# Patient Record
Sex: MALE | Race: WHITE | URBAN - METROPOLITAN AREA
[De-identification: names, ages, dates, MRNs, and addresses within clinical notes are randomized per-mention and may not be internally consistent; named-entity substitution may affect disease eponyms.]

---

## 2017-07-03 ENCOUNTER — OFFICE VISIT (OUTPATIENT)
Dept: FAMILY MEDICINE CLINIC | Age: 57
End: 2017-07-03
Payer: MEDICARE

## 2017-07-03 VITALS
HEART RATE: 87 BPM | DIASTOLIC BLOOD PRESSURE: 80 MMHG | TEMPERATURE: 98.2 F | OXYGEN SATURATION: 99 % | SYSTOLIC BLOOD PRESSURE: 132 MMHG | HEIGHT: 72 IN | WEIGHT: 184.97 LBS | BODY MASS INDEX: 25.05 KG/M2 | RESPIRATION RATE: 17 BRPM

## 2017-07-03 DIAGNOSIS — L02.511 ABSCESS OF RIGHT HAND: Primary | ICD-10-CM

## 2017-07-03 PROCEDURE — S0077 INJECTION, CLINDAMYCIN PHOSP: HCPCS | Performed by: FAMILY MEDICINE

## 2017-07-03 PROCEDURE — 99214 OFFICE O/P EST MOD 30 MIN: CPT | Performed by: FAMILY MEDICINE

## 2017-07-03 PROCEDURE — 10060 I&D ABSCESS SIMPLE/SINGLE: CPT | Performed by: FAMILY MEDICINE

## 2017-07-03 RX ORDER — IBUPROFEN 800 MG/1
800 TABLET ORAL EVERY 6 HOURS PRN
Qty: 90 TABLET | Refills: 3 | Status: SHIPPED | OUTPATIENT
Start: 2017-07-03 | End: 2017-08-02

## 2017-07-03 RX ORDER — CLINDAMYCIN HYDROCHLORIDE 150 MG/1
150 CAPSULE ORAL 3 TIMES DAILY
Qty: 21 CAPSULE | Refills: 0 | Status: SHIPPED | OUTPATIENT
Start: 2017-07-03 | End: 2017-07-10

## 2017-07-03 RX ORDER — HYDROCODONE BITARTRATE AND ACETAMINOPHEN 5; 325 MG/1; MG/1
0.5 TABLET ORAL 2 TIMES DAILY
Qty: 10 TABLET | Refills: 0 | Status: SHIPPED | OUTPATIENT
Start: 2017-07-03 | End: 2017-07-13

## 2017-07-03 RX ORDER — LIDOCAINE HYDROCHLORIDE 10 MG/ML
5 INJECTION, SOLUTION INFILTRATION; PERINEURAL ONCE
Status: COMPLETED | OUTPATIENT
Start: 2017-07-03 | End: 2017-07-03

## 2017-07-03 RX ORDER — CLINDAMYCIN PHOSPHATE 150 MG/ML
600 INJECTION, SOLUTION INTRAVENOUS ONCE
Status: COMPLETED | OUTPATIENT
Start: 2017-07-03 | End: 2017-07-03

## 2017-07-03 RX ADMIN — CLINDAMYCIN PHOSPHATE 600 MG: 150 INJECTION, SOLUTION INTRAVENOUS at 19:57

## 2017-07-03 RX ADMIN — LIDOCAINE HYDROCHLORIDE 5 ML: 10 INJECTION, SOLUTION INFILTRATION; PERINEURAL at 19:57

## 2017-07-03 ASSESSMENT — ENCOUNTER SYMPTOMS
GASTROINTESTINAL NEGATIVE: 1
ALLERGIC/IMMUNOLOGIC NEGATIVE: 1
EYES NEGATIVE: 1
RESPIRATORY NEGATIVE: 1

## 2021-01-01 ENCOUNTER — APPOINTMENT (OUTPATIENT)
Dept: ULTRASOUND IMAGING | Age: 61
DRG: 174 | End: 2021-01-01
Payer: MEDICARE

## 2021-01-01 ENCOUNTER — APPOINTMENT (OUTPATIENT)
Dept: GENERAL RADIOLOGY | Age: 61
DRG: 174 | End: 2021-01-01
Payer: MEDICARE

## 2021-01-01 ENCOUNTER — HOSPITAL ENCOUNTER (INPATIENT)
Age: 61
LOS: 4 days | DRG: 174 | End: 2021-08-23
Attending: EMERGENCY MEDICINE | Admitting: INTERNAL MEDICINE
Payer: MEDICARE

## 2021-01-01 ENCOUNTER — APPOINTMENT (OUTPATIENT)
Dept: MRI IMAGING | Age: 61
DRG: 174 | End: 2021-01-01
Payer: MEDICARE

## 2021-01-01 VITALS
BODY MASS INDEX: 21.16 KG/M2 | DIASTOLIC BLOOD PRESSURE: 37 MMHG | OXYGEN SATURATION: 81 % | HEIGHT: 74 IN | RESPIRATION RATE: 32 BRPM | WEIGHT: 164.9 LBS | SYSTOLIC BLOOD PRESSURE: 52 MMHG | TEMPERATURE: 97.9 F

## 2021-01-01 DIAGNOSIS — I21.3 ST ELEVATION MYOCARDIAL INFARCTION (STEMI), UNSPECIFIED ARTERY (HCC): ICD-10-CM

## 2021-01-01 DIAGNOSIS — I46.9 CARDIAC ARREST (HCC): Primary | ICD-10-CM

## 2021-01-01 LAB
-: NORMAL
ABSOLUTE EOS #: 0 K/UL (ref 0–0.4)
ABSOLUTE IMMATURE GRANULOCYTE: 0.43 K/UL (ref 0–0.3)
ABSOLUTE LYMPH #: 7.46 K/UL (ref 1–4.8)
ABSOLUTE MONO #: 1.7 K/UL (ref 0.1–0.8)
ALBUMIN SERPL-MCNC: 3 G/DL (ref 3.5–5.2)
ALBUMIN/GLOBULIN RATIO: 1.4 (ref 1–2.5)
ALLEN TEST: ABNORMAL
ALP BLD-CCNC: 69 U/L (ref 40–129)
ALT SERPL-CCNC: 44 U/L (ref 5–41)
AMORPHOUS: NORMAL
ANION GAP SERPL CALCULATED.3IONS-SCNC: 11 MMOL/L (ref 9–17)
ANION GAP SERPL CALCULATED.3IONS-SCNC: 11 MMOL/L (ref 9–17)
ANION GAP SERPL CALCULATED.3IONS-SCNC: 12 MMOL/L (ref 9–17)
ANION GAP SERPL CALCULATED.3IONS-SCNC: 12 MMOL/L (ref 9–17)
ANION GAP SERPL CALCULATED.3IONS-SCNC: 13 MMOL/L (ref 9–17)
ANION GAP SERPL CALCULATED.3IONS-SCNC: 14 MMOL/L (ref 9–17)
ANION GAP SERPL CALCULATED.3IONS-SCNC: 14 MMOL/L (ref 9–17)
ANION GAP SERPL CALCULATED.3IONS-SCNC: 16 MMOL/L (ref 9–17)
ANION GAP SERPL CALCULATED.3IONS-SCNC: 22 MMOL/L (ref 9–17)
ANION GAP: 12 MMOL/L (ref 7–16)
ANION GAP: 12 MMOL/L (ref 7–16)
AST SERPL-CCNC: 125 U/L
BACTERIA: NORMAL
BASOPHILS # BLD: 0 % (ref 0–2)
BASOPHILS ABSOLUTE: 0 K/UL (ref 0–0.2)
BILIRUB SERPL-MCNC: 0.41 MG/DL (ref 0.3–1.2)
BILIRUBIN URINE: NEGATIVE
BNP INTERPRETATION: ABNORMAL
BUN BLDV-MCNC: 16 MG/DL (ref 6–20)
BUN BLDV-MCNC: 19 MG/DL (ref 6–20)
BUN BLDV-MCNC: 20 MG/DL (ref 6–20)
BUN BLDV-MCNC: 26 MG/DL (ref 6–20)
BUN BLDV-MCNC: 27 MG/DL (ref 6–20)
BUN BLDV-MCNC: 37 MG/DL (ref 6–20)
BUN/CREAT BLD: ABNORMAL (ref 9–20)
CALCIUM IONIZED: 1.02 MMOL/L (ref 1.13–1.33)
CALCIUM IONIZED: 1.13 MMOL/L (ref 1.13–1.33)
CALCIUM IONIZED: 1.15 MMOL/L (ref 1.13–1.33)
CALCIUM IONIZED: 1.15 MMOL/L (ref 1.13–1.33)
CALCIUM IONIZED: 1.16 MMOL/L (ref 1.13–1.33)
CALCIUM IONIZED: 1.16 MMOL/L (ref 1.13–1.33)
CALCIUM IONIZED: 1.23 MMOL/L (ref 1.13–1.33)
CALCIUM SERPL-MCNC: 7.1 MG/DL (ref 8.6–10.4)
CALCIUM SERPL-MCNC: 7.4 MG/DL (ref 8.6–10.4)
CALCIUM SERPL-MCNC: 7.8 MG/DL (ref 8.6–10.4)
CALCIUM SERPL-MCNC: 7.9 MG/DL (ref 8.6–10.4)
CALCIUM SERPL-MCNC: 8 MG/DL (ref 8.6–10.4)
CALCIUM SERPL-MCNC: 8.1 MG/DL (ref 8.6–10.4)
CALCIUM SERPL-MCNC: 8.5 MG/DL (ref 8.6–10.4)
CALCIUM SERPL-MCNC: 8.5 MG/DL (ref 8.6–10.4)
CALCIUM SERPL-MCNC: 9 MG/DL (ref 8.6–10.4)
CASTS UA: NORMAL /LPF (ref 0–2)
CASTS UA: NORMAL /LPF (ref 0–2)
CHLORIDE BLD-SCNC: 105 MMOL/L (ref 98–107)
CHLORIDE BLD-SCNC: 107 MMOL/L (ref 98–107)
CHLORIDE BLD-SCNC: 107 MMOL/L (ref 98–107)
CHLORIDE BLD-SCNC: 111 MMOL/L (ref 98–107)
CHLORIDE BLD-SCNC: 111 MMOL/L (ref 98–107)
CHLORIDE BLD-SCNC: 112 MMOL/L (ref 98–107)
CHLORIDE BLD-SCNC: 113 MMOL/L (ref 98–107)
CHLORIDE BLD-SCNC: 113 MMOL/L (ref 98–107)
CHLORIDE BLD-SCNC: 114 MMOL/L (ref 98–107)
CHLORIDE BLD-SCNC: 114 MMOL/L (ref 98–107)
CHLORIDE BLD-SCNC: 116 MMOL/L (ref 98–107)
CHOLESTEROL/HDL RATIO: 4.2
CHOLESTEROL: 167 MG/DL
CO2: 12 MMOL/L (ref 20–31)
CO2: 14 MMOL/L (ref 20–31)
CO2: 17 MMOL/L (ref 20–31)
CO2: 18 MMOL/L (ref 20–31)
CO2: 18 MMOL/L (ref 20–31)
CO2: 19 MMOL/L (ref 20–31)
CO2: 20 MMOL/L (ref 20–31)
CO2: 21 MMOL/L (ref 20–31)
COLOR: YELLOW
COMMENT UA: ABNORMAL
CREAT SERPL-MCNC: 0.82 MG/DL (ref 0.7–1.2)
CREAT SERPL-MCNC: 0.89 MG/DL (ref 0.7–1.2)
CREAT SERPL-MCNC: 0.9 MG/DL (ref 0.7–1.2)
CREAT SERPL-MCNC: 0.96 MG/DL (ref 0.7–1.2)
CREAT SERPL-MCNC: 1.04 MG/DL (ref 0.7–1.2)
CREAT SERPL-MCNC: 1.12 MG/DL (ref 0.7–1.2)
CREAT SERPL-MCNC: 1.14 MG/DL (ref 0.7–1.2)
CREAT SERPL-MCNC: 1.19 MG/DL (ref 0.7–1.2)
CREAT SERPL-MCNC: 1.19 MG/DL (ref 0.7–1.2)
CREAT SERPL-MCNC: 1.25 MG/DL (ref 0.7–1.2)
CREAT SERPL-MCNC: 2.93 MG/DL (ref 0.7–1.2)
CREATININE URINE: 137.7 MG/DL (ref 39–259)
CRYSTALS, UA: NORMAL /HPF
DIFFERENTIAL TYPE: ABNORMAL
EKG ATRIAL RATE: 116 BPM
EKG ATRIAL RATE: 45 BPM
EKG P AXIS: 74 DEGREES
EKG P AXIS: 81 DEGREES
EKG P-R INTERVAL: 150 MS
EKG P-R INTERVAL: 172 MS
EKG Q-T INTERVAL: 318 MS
EKG Q-T INTERVAL: 606 MS
EKG QRS DURATION: 130 MS
EKG QRS DURATION: 98 MS
EKG QTC CALCULATION (BAZETT): 442 MS
EKG QTC CALCULATION (BAZETT): 524 MS
EKG R AXIS: 1 DEGREES
EKG R AXIS: 61 DEGREES
EKG T AXIS: -60 DEGREES
EKG T AXIS: 92 DEGREES
EKG VENTRICULAR RATE: 116 BPM
EKG VENTRICULAR RATE: 45 BPM
EOSINOPHILS RELATIVE PERCENT: 0 % (ref 1–4)
EPITHELIAL CELLS UA: NORMAL /HPF (ref 0–5)
FIO2: 100
FIO2: 100
FIO2: 30
FIO2: 40
FIO2: 50
FIO2: ABNORMAL
GFR AFRICAN AMERICAN: 27 ML/MIN
GFR AFRICAN AMERICAN: >60 ML/MIN
GFR NON-AFRICAN AMERICAN: 22 ML/MIN
GFR NON-AFRICAN AMERICAN: 47 ML/MIN
GFR NON-AFRICAN AMERICAN: 59 ML/MIN
GFR NON-AFRICAN AMERICAN: >60 ML/MIN
GFR NON-AFRICAN AMERICAN: ABNORMAL ML/MIN
GFR SERPL CREATININE-BSD FRML MDRD: 57 ML/MIN
GFR SERPL CREATININE-BSD FRML MDRD: ABNORMAL ML/MIN
GFR SERPL CREATININE-BSD FRML MDRD: ABNORMAL ML/MIN/{1.73_M2}
GLUCOSE BLD-MCNC: 102 MG/DL (ref 74–100)
GLUCOSE BLD-MCNC: 103 MG/DL (ref 70–99)
GLUCOSE BLD-MCNC: 110 MG/DL (ref 74–100)
GLUCOSE BLD-MCNC: 114 MG/DL (ref 70–99)
GLUCOSE BLD-MCNC: 114 MG/DL (ref 70–99)
GLUCOSE BLD-MCNC: 115 MG/DL (ref 74–100)
GLUCOSE BLD-MCNC: 119 MG/DL (ref 70–99)
GLUCOSE BLD-MCNC: 120 MG/DL (ref 70–99)
GLUCOSE BLD-MCNC: 126 MG/DL (ref 70–99)
GLUCOSE BLD-MCNC: 145 MG/DL (ref 70–99)
GLUCOSE BLD-MCNC: 147 MG/DL (ref 74–100)
GLUCOSE BLD-MCNC: 152 MG/DL (ref 74–100)
GLUCOSE BLD-MCNC: 153 MG/DL (ref 70–99)
GLUCOSE BLD-MCNC: 158 MG/DL (ref 74–100)
GLUCOSE BLD-MCNC: 160 MG/DL (ref 74–100)
GLUCOSE BLD-MCNC: 248 MG/DL (ref 70–99)
GLUCOSE BLD-MCNC: 254 MG/DL (ref 70–99)
GLUCOSE BLD-MCNC: 263 MG/DL (ref 74–100)
GLUCOSE BLD-MCNC: 63 MG/DL (ref 70–99)
GLUCOSE BLD-MCNC: 99 MG/DL (ref 74–100)
GLUCOSE BLD-MCNC: 99 MG/DL (ref 74–100)
GLUCOSE URINE: NEGATIVE
HCO3 VENOUS: 18.7 MMOL/L (ref 22–29)
HCT VFR BLD CALC: 32.9 % (ref 40.7–50.3)
HCT VFR BLD CALC: 33.7 % (ref 40.7–50.3)
HCT VFR BLD CALC: 36.9 % (ref 40.7–50.3)
HCT VFR BLD CALC: 40.3 % (ref 40.7–50.3)
HCT VFR BLD CALC: 40.7 % (ref 40.7–50.3)
HDLC SERPL-MCNC: 40 MG/DL
HEMOGLOBIN: 11.6 G/DL (ref 13–17)
HEMOGLOBIN: 11.6 G/DL (ref 13–17)
HEMOGLOBIN: 12.1 G/DL (ref 13–17)
HEMOGLOBIN: 9.8 G/DL (ref 13–17)
HEMOGLOBIN: 9.9 G/DL (ref 13–17)
IMMATURE GRANULOCYTES: 2 %
INR BLD: 1.1
INR BLD: 1.6
KETONES, URINE: NEGATIVE
LACTIC ACID, SEPSIS WHOLE BLOOD: 5.5 MMOL/L (ref 0.5–1.9)
LACTIC ACID, SEPSIS: ABNORMAL MMOL/L (ref 0.5–1.9)
LACTIC ACID, WHOLE BLOOD: 1.2 MMOL/L (ref 0.7–2.1)
LACTIC ACID, WHOLE BLOOD: 1.3 MMOL/L (ref 0.7–2.1)
LACTIC ACID, WHOLE BLOOD: 1.4 MMOL/L (ref 0.7–2.1)
LACTIC ACID, WHOLE BLOOD: 1.6 MMOL/L (ref 0.7–2.1)
LACTIC ACID, WHOLE BLOOD: 2.4 MMOL/L (ref 0.7–2.1)
LACTIC ACID: NORMAL MMOL/L
LDL CHOLESTEROL: 103 MG/DL (ref 0–130)
LEUKOCYTE ESTERASE, URINE: NEGATIVE
LV EF: 33 %
LVEF MODALITY: NORMAL
LYMPHOCYTES # BLD: 35 % (ref 24–44)
MAGNESIUM: 2 MG/DL (ref 1.6–2.6)
MAGNESIUM: 2 MG/DL (ref 1.6–2.6)
MAGNESIUM: 2.1 MG/DL (ref 1.6–2.6)
MAGNESIUM: 2.1 MG/DL (ref 1.6–2.6)
MAGNESIUM: 2.2 MG/DL (ref 1.6–2.6)
MAGNESIUM: 2.2 MG/DL (ref 1.6–2.6)
MAGNESIUM: 3.2 MG/DL (ref 1.6–2.6)
MCH RBC QN AUTO: 29.7 PG (ref 25.2–33.5)
MCH RBC QN AUTO: 29.7 PG (ref 25.2–33.5)
MCH RBC QN AUTO: 30.2 PG (ref 25.2–33.5)
MCH RBC QN AUTO: 30.2 PG (ref 25.2–33.5)
MCH RBC QN AUTO: 30.5 PG (ref 25.2–33.5)
MCHC RBC AUTO-ENTMCNC: 28.5 G/DL (ref 28.4–34.8)
MCHC RBC AUTO-ENTMCNC: 29.1 G/DL (ref 28.4–34.8)
MCHC RBC AUTO-ENTMCNC: 30 G/DL (ref 28.4–34.8)
MCHC RBC AUTO-ENTMCNC: 30.1 G/DL (ref 28.4–34.8)
MCHC RBC AUTO-ENTMCNC: 31.4 G/DL (ref 28.4–34.8)
MCV RBC AUTO: 101.2 FL (ref 82.6–102.9)
MCV RBC AUTO: 104 FL (ref 82.6–102.9)
MCV RBC AUTO: 104.1 FL (ref 82.6–102.9)
MCV RBC AUTO: 96.1 FL (ref 82.6–102.9)
MCV RBC AUTO: 98.8 FL (ref 82.6–102.9)
MODE: ABNORMAL
MONOCYTES # BLD: 8 % (ref 1–7)
MORPHOLOGY: ABNORMAL
MUCUS: NORMAL
NEGATIVE BASE EXCESS, ART: 2 (ref 0–2)
NEGATIVE BASE EXCESS, ART: 3 (ref 0–2)
NEGATIVE BASE EXCESS, ART: 4 (ref 0–2)
NEGATIVE BASE EXCESS, ART: 5 (ref 0–2)
NEGATIVE BASE EXCESS, ART: 5 (ref 0–2)
NEGATIVE BASE EXCESS, ART: 7 (ref 0–2)
NEGATIVE BASE EXCESS, ART: 9 (ref 0–2)
NEGATIVE BASE EXCESS, VEN: 16 (ref 0–2)
NEURON SPEC ENOLASE: 14.1 UG/L (ref 3.7–8.9)
NEURON SPEC ENOLASE: 17.8 UG/L (ref 3.7–8.9)
NITRITE, URINE: NEGATIVE
NRBC AUTOMATED: 0 PER 100 WBC
NRBC AUTOMATED: 0.5 PER 100 WBC
NRBC AUTOMATED: 10.1 PER 100 WBC
O2 DEVICE/FLOW/%: ABNORMAL
O2 SAT, VEN: 16 % (ref 60–85)
OTHER OBSERVATIONS UA: NORMAL
PARTIAL THROMBOPLASTIN TIME: 26.1 SEC (ref 20.5–30.5)
PATIENT TEMP: 33
PATIENT TEMP: 33.7
PATIENT TEMP: 34.7
PATIENT TEMP: 35
PATIENT TEMP: 37.6
PATIENT TEMP: ABNORMAL
PCO2, VEN: 94.8 MM HG (ref 41–51)
PDW BLD-RTO: 14.7 % (ref 11.8–14.4)
PDW BLD-RTO: 14.9 % (ref 11.8–14.4)
PDW BLD-RTO: 15.1 % (ref 11.8–14.4)
PDW BLD-RTO: 15.6 % (ref 11.8–14.4)
PDW BLD-RTO: 15.6 % (ref 11.8–14.4)
PH UA: 5 (ref 5–8)
PH VENOUS: 6.9 (ref 7.32–7.43)
PHOSPHORUS: 3.9 MG/DL (ref 2.5–4.5)
PHOSPHORUS: 4.1 MG/DL (ref 2.5–4.5)
PHOSPHORUS: 4.1 MG/DL (ref 2.5–4.5)
PHOSPHORUS: 4.4 MG/DL (ref 2.5–4.5)
PHOSPHORUS: 4.5 MG/DL (ref 2.5–4.5)
PHOSPHORUS: 4.7 MG/DL (ref 2.5–4.5)
PLATELET # BLD: 113 K/UL (ref 138–453)
PLATELET # BLD: 140 K/UL (ref 138–453)
PLATELET # BLD: 184 K/UL (ref 138–453)
PLATELET # BLD: 210 K/UL (ref 138–453)
PLATELET # BLD: 239 K/UL (ref 138–453)
PLATELET ESTIMATE: ABNORMAL
PMV BLD AUTO: 10.9 FL (ref 8.1–13.5)
PMV BLD AUTO: 11.4 FL (ref 8.1–13.5)
PMV BLD AUTO: 11.9 FL (ref 8.1–13.5)
PMV BLD AUTO: 12.3 FL (ref 8.1–13.5)
PMV BLD AUTO: 12.3 FL (ref 8.1–13.5)
PO2, VEN: 23.2 MM HG (ref 30–50)
POC BUN: 16 MG/DL (ref 8–26)
POC BUN: 27 MG/DL (ref 8–26)
POC CHLORIDE: 111 MMOL/L (ref 98–107)
POC CHLORIDE: 115 MMOL/L (ref 98–107)
POC CREATININE: 1.27 MG/DL (ref 0.51–1.19)
POC CREATININE: 1.52 MG/DL (ref 0.51–1.19)
POC HCO3: 18.7 MMOL/L (ref 21–28)
POC HCO3: 20.3 MMOL/L (ref 21–28)
POC HCO3: 22 MMOL/L (ref 21–28)
POC HCO3: 22.4 MMOL/L (ref 21–28)
POC HCO3: 22.5 MMOL/L (ref 21–28)
POC HCO3: 22.6 MMOL/L (ref 21–28)
POC HCO3: 23.5 MMOL/L (ref 21–28)
POC HCO3: 23.5 MMOL/L (ref 21–28)
POC HCO3: 23.7 MMOL/L (ref 21–28)
POC HCO3: 25.6 MMOL/L (ref 21–28)
POC HCO3: 25.8 MMOL/L (ref 21–28)
POC HEMATOCRIT: 31 % (ref 41–53)
POC HEMATOCRIT: 38 % (ref 41–53)
POC HEMOGLOBIN: 10.6 G/DL (ref 13.5–17.5)
POC HEMOGLOBIN: 13 G/DL (ref 13.5–17.5)
POC IONIZED CALCIUM: 1.18 MMOL/L (ref 1.15–1.33)
POC IONIZED CALCIUM: 1.19 MMOL/L (ref 1.15–1.33)
POC LACTIC ACID: 6.21 MMOL/L (ref 0.56–1.39)
POC LACTIC ACID: 9.36 MMOL/L (ref 0.56–1.39)
POC O2 SATURATION: 100 % (ref 94–98)
POC O2 SATURATION: 91 % (ref 94–98)
POC O2 SATURATION: 93 % (ref 94–98)
POC O2 SATURATION: 95 % (ref 94–98)
POC O2 SATURATION: 95 % (ref 94–98)
POC O2 SATURATION: 96 % (ref 94–98)
POC O2 SATURATION: 97 % (ref 94–98)
POC O2 SATURATION: 99 % (ref 94–98)
POC PCO2 TEMP: 37 MM HG
POC PCO2 TEMP: 40 MM HG
POC PCO2 TEMP: 41 MM HG
POC PCO2 TEMP: 42 MM HG
POC PCO2 TEMP: 42 MM HG
POC PCO2 TEMP: 43 MM HG
POC PCO2 TEMP: 43 MM HG
POC PCO2 TEMP: 44 MM HG
POC PCO2 TEMP: ABNORMAL MM HG
POC PCO2: 42.4 MM HG (ref 35–48)
POC PCO2: 43.5 MM HG (ref 35–48)
POC PCO2: 43.6 MM HG (ref 35–48)
POC PCO2: 44 MM HG (ref 35–48)
POC PCO2: 46.7 MM HG (ref 35–48)
POC PCO2: 46.7 MM HG (ref 35–48)
POC PCO2: 47.4 MM HG (ref 35–48)
POC PCO2: 48.6 MM HG (ref 35–48)
POC PCO2: 49.3 MM HG (ref 35–48)
POC PCO2: 51.4 MM HG (ref 35–48)
POC PCO2: 51.7 MM HG (ref 35–48)
POC PCO2: 53.4 MM HG (ref 35–48)
POC PCO2: 64.3 MM HG (ref 35–48)
POC PH TEMP: 7.24
POC PH TEMP: 7.32
POC PH TEMP: 7.32
POC PH TEMP: 7.33
POC PH TEMP: 7.35
POC PH TEMP: 7.36
POC PH TEMP: 7.37
POC PH TEMP: 7.39
POC PH TEMP: ABNORMAL
POC PH: 7.17 (ref 7.35–7.45)
POC PH: 7.21 (ref 7.35–7.45)
POC PH: 7.24 (ref 7.35–7.45)
POC PH: 7.27 (ref 7.35–7.45)
POC PH: 7.27 (ref 7.35–7.45)
POC PH: 7.29 (ref 7.35–7.45)
POC PH: 7.3 (ref 7.35–7.45)
POC PH: 7.32 (ref 7.35–7.45)
POC PH: 7.32 (ref 7.35–7.45)
POC PH: 7.34 (ref 7.35–7.45)
POC PH: 7.34 (ref 7.35–7.45)
POC PO2 TEMP: 120 MM HG
POC PO2 TEMP: 142 MM HG
POC PO2 TEMP: 160 MM HG
POC PO2 TEMP: 61 MM HG
POC PO2 TEMP: 63 MM HG
POC PO2 TEMP: 65 MM HG
POC PO2 TEMP: 67 MM HG
POC PO2 TEMP: 75 MM HG
POC PO2 TEMP: ABNORMAL MM HG
POC PO2: 121.2 MM HG (ref 83–108)
POC PO2: 135.8 MM HG (ref 83–108)
POC PO2: 142.5 MM HG (ref 83–108)
POC PO2: 163.7 MM HG (ref 83–108)
POC PO2: 170.8 MM HG (ref 83–108)
POC PO2: 245.1 MM HG (ref 83–108)
POC PO2: 72 MM HG (ref 83–108)
POC PO2: 73.3 MM HG (ref 83–108)
POC PO2: 74 MM HG (ref 83–108)
POC PO2: 76 MM HG (ref 83–108)
POC PO2: 83.9 MM HG (ref 83–108)
POC PO2: 86.3 MM HG (ref 83–108)
POC PO2: 87.7 MM HG (ref 83–108)
POC POTASSIUM: 2.7 MMOL/L (ref 3.5–4.5)
POC POTASSIUM: 4.2 MMOL/L (ref 3.5–4.5)
POC POTASSIUM: 4.5 MMOL/L (ref 3.5–4.5)
POC SODIUM: 143 MMOL/L (ref 138–146)
POC SODIUM: 151 MMOL/L (ref 138–146)
POC TCO2: 21 MMOL/L (ref 22–30)
POC TCO2: 25 MMOL/L (ref 22–30)
POSITIVE BASE EXCESS, ART: ABNORMAL (ref 0–3)
POSITIVE BASE EXCESS, VEN: ABNORMAL (ref 0–3)
POTASSIUM SERPL-SCNC: 4.1 MMOL/L (ref 3.7–5.3)
POTASSIUM SERPL-SCNC: 4.1 MMOL/L (ref 3.7–5.3)
POTASSIUM SERPL-SCNC: 4.2 MMOL/L (ref 3.7–5.3)
POTASSIUM SERPL-SCNC: 4.3 MMOL/L (ref 3.7–5.3)
POTASSIUM SERPL-SCNC: 4.4 MMOL/L (ref 3.7–5.3)
POTASSIUM SERPL-SCNC: 4.6 MMOL/L (ref 3.7–5.3)
POTASSIUM SERPL-SCNC: 5.6 MMOL/L (ref 3.7–5.3)
POTASSIUM, UR: 73.8 MMOL/L
PRO-BNP: 697 PG/ML
PROTEIN UA: ABNORMAL
PROTHROMBIN TIME: 11.7 SEC (ref 9.1–12.3)
PROTHROMBIN TIME: 16.9 SEC (ref 9.1–12.3)
RBC # BLD: 3.24 M/UL (ref 4.21–5.77)
RBC # BLD: 3.25 M/UL (ref 4.21–5.77)
RBC # BLD: 3.84 M/UL (ref 4.21–5.77)
RBC # BLD: 3.91 M/UL (ref 4.21–5.77)
RBC # BLD: 4.08 M/UL (ref 4.21–5.77)
RBC # BLD: ABNORMAL 10*6/UL
RBC UA: NORMAL /HPF (ref 0–2)
RENAL EPITHELIAL, UA: NORMAL /HPF
SAMPLE SITE: ABNORMAL
SEG NEUTROPHILS: 55 % (ref 36–66)
SEGMENTED NEUTROPHILS ABSOLUTE COUNT: 11.71 K/UL (ref 1.8–7.7)
SODIUM BLD-SCNC: 139 MMOL/L (ref 135–144)
SODIUM BLD-SCNC: 140 MMOL/L (ref 135–144)
SODIUM BLD-SCNC: 141 MMOL/L (ref 135–144)
SODIUM BLD-SCNC: 142 MMOL/L (ref 135–144)
SODIUM BLD-SCNC: 142 MMOL/L (ref 135–144)
SODIUM BLD-SCNC: 143 MMOL/L (ref 135–144)
SODIUM BLD-SCNC: 143 MMOL/L (ref 135–144)
SODIUM BLD-SCNC: 144 MMOL/L (ref 135–144)
SODIUM BLD-SCNC: 145 MMOL/L (ref 135–144)
SODIUM BLD-SCNC: 146 MMOL/L (ref 135–144)
SODIUM BLD-SCNC: 148 MMOL/L (ref 135–144)
SODIUM,UR: 30 MMOL/L
SPECIFIC GRAVITY UA: 1.02 (ref 1–1.03)
TCO2 (CALC), ART: ABNORMAL MMOL/L (ref 22–29)
TOTAL CO2, VENOUS: ABNORMAL MMOL/L (ref 23–30)
TOTAL PROTEIN: 5.1 G/DL (ref 6.4–8.3)
TRICHOMONAS: NORMAL
TRIGL SERPL-MCNC: 119 MG/DL
TRIGL SERPL-MCNC: 134 MG/DL
TROPONIN INTERP: ABNORMAL
TROPONIN T: ABNORMAL NG/ML
TROPONIN, HIGH SENSITIVITY: 1639 NG/L (ref 0–22)
TROPONIN, HIGH SENSITIVITY: 1680 NG/L (ref 0–22)
TROPONIN, HIGH SENSITIVITY: 2275 NG/L (ref 0–22)
TROPONIN, HIGH SENSITIVITY: 2830 NG/L (ref 0–22)
TROPONIN, HIGH SENSITIVITY: 3748 NG/L (ref 0–22)
TROPONIN, HIGH SENSITIVITY: 5292 NG/L (ref 0–22)
TROPONIN, HIGH SENSITIVITY: 6204 NG/L (ref 0–22)
TROPONIN, HIGH SENSITIVITY: 821 NG/L (ref 0–22)
TURBIDITY: ABNORMAL
URINE HGB: ABNORMAL
UROBILINOGEN, URINE: NORMAL
VLDLC SERPL CALC-MCNC: ABNORMAL MG/DL (ref 1–30)
WBC # BLD: 11.3 K/UL (ref 3.5–11.3)
WBC # BLD: 2.6 K/UL (ref 3.5–11.3)
WBC # BLD: 21.3 K/UL (ref 3.5–11.3)
WBC # BLD: 3.7 K/UL (ref 3.5–11.3)
WBC # BLD: 6.8 K/UL (ref 3.5–11.3)
WBC # BLD: ABNORMAL 10*3/UL
WBC UA: NORMAL /HPF (ref 0–5)
YEAST: NORMAL

## 2021-01-01 PROCEDURE — 6370000000 HC RX 637 (ALT 250 FOR IP): Performed by: STUDENT IN AN ORGANIZED HEALTH CARE EDUCATION/TRAINING PROGRAM

## 2021-01-01 PROCEDURE — 37799 UNLISTED PX VASCULAR SURGERY: CPT

## 2021-01-01 PROCEDURE — 84520 ASSAY OF UREA NITROGEN: CPT

## 2021-01-01 PROCEDURE — 93005 ELECTROCARDIOGRAM TRACING: CPT | Performed by: INTERNAL MEDICINE

## 2021-01-01 PROCEDURE — 6360000002 HC RX W HCPCS: Performed by: INTERNAL MEDICINE

## 2021-01-01 PROCEDURE — 82803 BLOOD GASES ANY COMBINATION: CPT

## 2021-01-01 PROCEDURE — 2580000003 HC RX 258: Performed by: INTERNAL MEDICINE

## 2021-01-01 PROCEDURE — 85027 COMPLETE CBC AUTOMATED: CPT

## 2021-01-01 PROCEDURE — 80061 LIPID PANEL: CPT

## 2021-01-01 PROCEDURE — 83735 ASSAY OF MAGNESIUM: CPT

## 2021-01-01 PROCEDURE — 80048 BASIC METABOLIC PNL TOTAL CA: CPT

## 2021-01-01 PROCEDURE — 86316 IMMUNOASSAY TUMOR OTHER: CPT

## 2021-01-01 PROCEDURE — 2500000003 HC RX 250 WO HCPCS: Performed by: STUDENT IN AN ORGANIZED HEALTH CARE EDUCATION/TRAINING PROGRAM

## 2021-01-01 PROCEDURE — 2580000003 HC RX 258: Performed by: STUDENT IN AN ORGANIZED HEALTH CARE EDUCATION/TRAINING PROGRAM

## 2021-01-01 PROCEDURE — 82330 ASSAY OF CALCIUM: CPT

## 2021-01-01 PROCEDURE — 94003 VENT MGMT INPAT SUBQ DAY: CPT

## 2021-01-01 PROCEDURE — 0BH17EZ INSERTION OF ENDOTRACHEAL AIRWAY INTO TRACHEA, VIA NATURAL OR ARTIFICIAL OPENING: ICD-10-PCS | Performed by: INTERNAL MEDICINE

## 2021-01-01 PROCEDURE — 83605 ASSAY OF LACTIC ACID: CPT

## 2021-01-01 PROCEDURE — 6360000002 HC RX W HCPCS: Performed by: STUDENT IN AN ORGANIZED HEALTH CARE EDUCATION/TRAINING PROGRAM

## 2021-01-01 PROCEDURE — 82565 ASSAY OF CREATININE: CPT

## 2021-01-01 PROCEDURE — 82570 ASSAY OF URINE CREATININE: CPT

## 2021-01-01 PROCEDURE — 6360000002 HC RX W HCPCS

## 2021-01-01 PROCEDURE — 85025 COMPLETE CBC W/AUTO DIFF WBC: CPT

## 2021-01-01 PROCEDURE — 6370000000 HC RX 637 (ALT 250 FOR IP)

## 2021-01-01 PROCEDURE — 85730 THROMBOPLASTIN TIME PARTIAL: CPT

## 2021-01-01 PROCEDURE — 82947 ASSAY GLUCOSE BLOOD QUANT: CPT

## 2021-01-01 PROCEDURE — C1874 STENT, COATED/COV W/DEL SYS: HCPCS

## 2021-01-01 PROCEDURE — 84132 ASSAY OF SERUM POTASSIUM: CPT

## 2021-01-01 PROCEDURE — 93458 L HRT ARTERY/VENTRICLE ANGIO: CPT | Performed by: INTERNAL MEDICINE

## 2021-01-01 PROCEDURE — 83880 ASSAY OF NATRIURETIC PEPTIDE: CPT

## 2021-01-01 PROCEDURE — 93010 ELECTROCARDIOGRAM REPORT: CPT | Performed by: INTERNAL MEDICINE

## 2021-01-01 PROCEDURE — 95720 EEG PHY/QHP EA INCR W/VEEG: CPT | Performed by: PSYCHIATRY & NEUROLOGY

## 2021-01-01 PROCEDURE — 80051 ELECTROLYTE PANEL: CPT

## 2021-01-01 PROCEDURE — 94761 N-INVAS EAR/PLS OXIMETRY MLT: CPT

## 2021-01-01 PROCEDURE — 71045 X-RAY EXAM CHEST 1 VIEW: CPT

## 2021-01-01 PROCEDURE — 95714 VEEG EA 12-26 HR UNMNTR: CPT

## 2021-01-01 PROCEDURE — 93306 TTE W/DOPPLER COMPLETE: CPT

## 2021-01-01 PROCEDURE — 6360000004 HC RX CONTRAST MEDICATION

## 2021-01-01 PROCEDURE — 85610 PROTHROMBIN TIME: CPT

## 2021-01-01 PROCEDURE — 84100 ASSAY OF PHOSPHORUS: CPT

## 2021-01-01 PROCEDURE — C1769 GUIDE WIRE: HCPCS

## 2021-01-01 PROCEDURE — C1751 CATH, INF, PER/CENT/MIDLINE: HCPCS

## 2021-01-01 PROCEDURE — 99291 CRITICAL CARE FIRST HOUR: CPT | Performed by: INTERNAL MEDICINE

## 2021-01-01 PROCEDURE — 96374 THER/PROPH/DIAG INJ IV PUSH: CPT

## 2021-01-01 PROCEDURE — 99232 SBSQ HOSP IP/OBS MODERATE 35: CPT | Performed by: INTERNAL MEDICINE

## 2021-01-01 PROCEDURE — 2709999900 HC NON-CHARGEABLE SUPPLY

## 2021-01-01 PROCEDURE — 2000000000 HC ICU R&B

## 2021-01-01 PROCEDURE — 80053 COMPREHEN METABOLIC PANEL: CPT

## 2021-01-01 PROCEDURE — 93308 TTE F-UP OR LMTD: CPT

## 2021-01-01 PROCEDURE — 84133 ASSAY OF URINE POTASSIUM: CPT

## 2021-01-01 PROCEDURE — 76770 US EXAM ABDO BACK WALL COMP: CPT

## 2021-01-01 PROCEDURE — 2700000000 HC OXYGEN THERAPY PER DAY

## 2021-01-01 PROCEDURE — 2500000003 HC RX 250 WO HCPCS

## 2021-01-01 PROCEDURE — 81001 URINALYSIS AUTO W/SCOPE: CPT

## 2021-01-01 PROCEDURE — 36556 INSERT NON-TUNNEL CV CATH: CPT | Performed by: INTERNAL MEDICINE

## 2021-01-01 PROCEDURE — C1887 CATHETER, GUIDING: HCPCS

## 2021-01-01 PROCEDURE — 84484 ASSAY OF TROPONIN QUANT: CPT

## 2021-01-01 PROCEDURE — 99221 1ST HOSP IP/OBS SF/LOW 40: CPT | Performed by: INTERNAL MEDICINE

## 2021-01-01 PROCEDURE — 85014 HEMATOCRIT: CPT

## 2021-01-01 PROCEDURE — 4A023N7 MEASUREMENT OF CARDIAC SAMPLING AND PRESSURE, LEFT HEART, PERCUTANEOUS APPROACH: ICD-10-PCS | Performed by: INTERNAL MEDICINE

## 2021-01-01 PROCEDURE — 5A2204Z RESTORATION OF CARDIAC RHYTHM, SINGLE: ICD-10-PCS | Performed by: INTERNAL MEDICINE

## 2021-01-01 PROCEDURE — 84300 ASSAY OF URINE SODIUM: CPT

## 2021-01-01 PROCEDURE — 5A1945Z RESPIRATORY VENTILATION, 24-96 CONSECUTIVE HOURS: ICD-10-PCS | Performed by: INTERNAL MEDICINE

## 2021-01-01 PROCEDURE — 93325 DOPPLER ECHO COLOR FLOW MAPG: CPT

## 2021-01-01 PROCEDURE — 84478 ASSAY OF TRIGLYCERIDES: CPT

## 2021-01-01 PROCEDURE — 93005 ELECTROCARDIOGRAM TRACING: CPT | Performed by: STUDENT IN AN ORGANIZED HEALTH CARE EDUCATION/TRAINING PROGRAM

## 2021-01-01 PROCEDURE — C1725 CATH, TRANSLUMIN NON-LASER: HCPCS

## 2021-01-01 PROCEDURE — 92941 PRQ TRLML REVSC TOT OCCL AMI: CPT | Performed by: INTERNAL MEDICINE

## 2021-01-01 PROCEDURE — 94002 VENT MGMT INPAT INIT DAY: CPT

## 2021-01-01 PROCEDURE — 027035Z DILATION OF CORONARY ARTERY, ONE ARTERY WITH TWO DRUG-ELUTING INTRALUMINAL DEVICES, PERCUTANEOUS APPROACH: ICD-10-PCS | Performed by: INTERNAL MEDICINE

## 2021-01-01 PROCEDURE — B2161ZZ FLUOROSCOPY OF RIGHT AND LEFT HEART USING LOW OSMOLAR CONTRAST: ICD-10-PCS | Performed by: INTERNAL MEDICINE

## 2021-01-01 PROCEDURE — B2151ZZ FLUOROSCOPY OF LEFT HEART USING LOW OSMOLAR CONTRAST: ICD-10-PCS | Performed by: INTERNAL MEDICINE

## 2021-01-01 PROCEDURE — 96375 TX/PRO/DX INJ NEW DRUG ADDON: CPT

## 2021-01-01 PROCEDURE — 99284 EMERGENCY DEPT VISIT MOD MDM: CPT

## 2021-01-01 RX ORDER — CALCIUM GLUCONATE 20 MG/ML
2000 INJECTION, SOLUTION INTRAVENOUS ONCE
Status: COMPLETED | OUTPATIENT
Start: 2021-01-01 | End: 2021-01-01

## 2021-01-01 RX ORDER — LIDOCAINE HYDROCHLORIDE ANHYDROUS AND DEXTROSE MONOHYDRATE .4; 5 G/100ML; G/100ML
INJECTION, SOLUTION INTRAVENOUS
Status: COMPLETED
Start: 2021-01-01 | End: 2021-01-01

## 2021-01-01 RX ORDER — CISATRACURIUM BESYLATE 10 MG/ML
0.15 INJECTION, SOLUTION INTRAVENOUS ONCE
Status: DISCONTINUED | OUTPATIENT
Start: 2021-01-01 | End: 2021-01-01

## 2021-01-01 RX ORDER — PROPOFOL 10 MG/ML
5-50 INJECTION, EMULSION INTRAVENOUS
Status: DISCONTINUED | OUTPATIENT
Start: 2021-01-01 | End: 2021-01-01 | Stop reason: HOSPADM

## 2021-01-01 RX ORDER — SODIUM CHLORIDE 0.9 % (FLUSH) 0.9 %
5-40 SYRINGE (ML) INJECTION PRN
Status: DISCONTINUED | OUTPATIENT
Start: 2021-01-01 | End: 2021-01-01 | Stop reason: HOSPADM

## 2021-01-01 RX ORDER — ASPIRIN 81 MG/1
324 TABLET, CHEWABLE ORAL ONCE
Status: DISCONTINUED | OUTPATIENT
Start: 2021-01-01 | End: 2021-01-01 | Stop reason: HOSPADM

## 2021-01-01 RX ORDER — ASPIRIN 81 MG/1
81 TABLET ORAL DAILY
Status: DISCONTINUED | OUTPATIENT
Start: 2021-01-01 | End: 2021-01-01 | Stop reason: HOSPADM

## 2021-01-01 RX ORDER — METOPROLOL TARTRATE 5 MG/5ML
2.5 INJECTION INTRAVENOUS EVERY 6 HOURS PRN
Status: DISCONTINUED | OUTPATIENT
Start: 2021-01-01 | End: 2021-01-01 | Stop reason: HOSPADM

## 2021-01-01 RX ORDER — ONDANSETRON 4 MG/1
4 TABLET, ORALLY DISINTEGRATING ORAL EVERY 8 HOURS PRN
Status: DISCONTINUED | OUTPATIENT
Start: 2021-01-01 | End: 2021-01-01 | Stop reason: HOSPADM

## 2021-01-01 RX ORDER — MORPHINE SULFATE 4 MG/ML
4 INJECTION, SOLUTION INTRAMUSCULAR; INTRAVENOUS
Status: DISCONTINUED | OUTPATIENT
Start: 2021-01-01 | End: 2021-01-01 | Stop reason: HOSPADM

## 2021-01-01 RX ORDER — POTASSIUM CHLORIDE 20 MEQ/1
40 TABLET, EXTENDED RELEASE ORAL PRN
Status: DISCONTINUED | OUTPATIENT
Start: 2021-01-01 | End: 2021-01-01 | Stop reason: HOSPADM

## 2021-01-01 RX ORDER — DIGOXIN 0.25 MG/ML
250 INJECTION INTRAMUSCULAR; INTRAVENOUS ONCE
Status: DISCONTINUED | OUTPATIENT
Start: 2021-01-01 | End: 2021-01-01 | Stop reason: HOSPADM

## 2021-01-01 RX ORDER — CHLORHEXIDINE GLUCONATE 0.12 MG/ML
15 RINSE ORAL 2 TIMES DAILY
Status: DISCONTINUED | OUTPATIENT
Start: 2021-01-01 | End: 2021-01-01

## 2021-01-01 RX ORDER — ATORVASTATIN CALCIUM 80 MG/1
80 TABLET, FILM COATED ORAL NIGHTLY
Status: DISCONTINUED | OUTPATIENT
Start: 2021-01-01 | End: 2021-01-01 | Stop reason: HOSPADM

## 2021-01-01 RX ORDER — MIDAZOLAM HYDROCHLORIDE 2 MG/2ML
2 INJECTION, SOLUTION INTRAMUSCULAR; INTRAVENOUS ONCE
Status: COMPLETED | OUTPATIENT
Start: 2021-01-01 | End: 2021-01-01

## 2021-01-01 RX ORDER — LIDOCAINE HYDROCHLORIDE ANHYDROUS AND DEXTROSE MONOHYDRATE .4; 5 G/100ML; G/100ML
1 INJECTION, SOLUTION INTRAVENOUS CONTINUOUS
Status: DISCONTINUED | OUTPATIENT
Start: 2021-01-01 | End: 2021-01-01 | Stop reason: HOSPADM

## 2021-01-01 RX ORDER — MAGNESIUM SULFATE 1 G/100ML
1000 INJECTION INTRAVENOUS PRN
Status: DISCONTINUED | OUTPATIENT
Start: 2021-01-01 | End: 2021-01-01 | Stop reason: HOSPADM

## 2021-01-01 RX ORDER — HEPARIN SODIUM 1000 [USP'U]/ML
INJECTION, SOLUTION INTRAVENOUS; SUBCUTANEOUS
Status: COMPLETED
Start: 2021-01-01 | End: 2021-01-01

## 2021-01-01 RX ORDER — POTASSIUM CHLORIDE 29.8 MG/ML
20 INJECTION INTRAVENOUS PRN
Status: DISCONTINUED | OUTPATIENT
Start: 2021-01-01 | End: 2021-01-01 | Stop reason: HOSPADM

## 2021-01-01 RX ORDER — HEPARIN SODIUM 1000 [USP'U]/ML
4000 INJECTION, SOLUTION INTRAVENOUS; SUBCUTANEOUS PRN
Status: DISCONTINUED | OUTPATIENT
Start: 2021-01-01 | End: 2021-01-01

## 2021-01-01 RX ORDER — METOPROLOL TARTRATE 5 MG/5ML
INJECTION INTRAVENOUS
Status: DISPENSED
Start: 2021-01-01 | End: 2021-01-01

## 2021-01-01 RX ORDER — GLYCOPYRROLATE 0.2 MG/ML
0.4 INJECTION INTRAMUSCULAR; INTRAVENOUS EVERY 4 HOURS PRN
Status: DISCONTINUED | OUTPATIENT
Start: 2021-01-01 | End: 2021-01-01 | Stop reason: HOSPADM

## 2021-01-01 RX ORDER — HEPARIN SODIUM 10000 [USP'U]/100ML
5-30 INJECTION, SOLUTION INTRAVENOUS CONTINUOUS
Status: DISCONTINUED | OUTPATIENT
Start: 2021-01-01 | End: 2021-01-01

## 2021-01-01 RX ORDER — NOREPINEPHRINE BIT/0.9 % NACL 16MG/250ML
2-100 INFUSION BOTTLE (ML) INTRAVENOUS CONTINUOUS
Status: DISCONTINUED | OUTPATIENT
Start: 2021-01-01 | End: 2021-01-01 | Stop reason: HOSPADM

## 2021-01-01 RX ORDER — SODIUM CHLORIDE 0.9 % (FLUSH) 0.9 %
5-40 SYRINGE (ML) INJECTION EVERY 12 HOURS SCHEDULED
Status: DISCONTINUED | OUTPATIENT
Start: 2021-01-01 | End: 2021-01-01 | Stop reason: HOSPADM

## 2021-01-01 RX ORDER — 0.9 % SODIUM CHLORIDE 0.9 %
250 INTRAVENOUS SOLUTION INTRAVENOUS ONCE
Status: DISCONTINUED | OUTPATIENT
Start: 2021-01-01 | End: 2021-01-01

## 2021-01-01 RX ORDER — ACETAMINOPHEN 325 MG/1
650 TABLET ORAL EVERY 4 HOURS PRN
Status: DISCONTINUED | OUTPATIENT
Start: 2021-01-01 | End: 2021-01-01 | Stop reason: HOSPADM

## 2021-01-01 RX ORDER — MIDAZOLAM HYDROCHLORIDE 1 MG/ML
INJECTION INTRAMUSCULAR; INTRAVENOUS
Status: COMPLETED
Start: 2021-01-01 | End: 2021-01-01

## 2021-01-01 RX ORDER — FUROSEMIDE 10 MG/ML
20 INJECTION INTRAMUSCULAR; INTRAVENOUS ONCE
Status: COMPLETED | OUTPATIENT
Start: 2021-01-01 | End: 2021-01-01

## 2021-01-01 RX ORDER — ONDANSETRON 2 MG/ML
4 INJECTION INTRAMUSCULAR; INTRAVENOUS EVERY 6 HOURS PRN
Status: DISCONTINUED | OUTPATIENT
Start: 2021-01-01 | End: 2021-01-01 | Stop reason: HOSPADM

## 2021-01-01 RX ORDER — PROPOFOL 10 MG/ML
INJECTION, EMULSION INTRAVENOUS
Status: COMPLETED
Start: 2021-01-01 | End: 2021-01-01

## 2021-01-01 RX ORDER — MAGNESIUM SULFATE IN WATER 40 MG/ML
2000 INJECTION, SOLUTION INTRAVENOUS ONCE
Status: COMPLETED | OUTPATIENT
Start: 2021-01-01 | End: 2021-01-01

## 2021-01-01 RX ORDER — LORAZEPAM 2 MG/ML
2 INJECTION INTRAMUSCULAR EVERY 4 HOURS PRN
Status: DISCONTINUED | OUTPATIENT
Start: 2021-01-01 | End: 2021-01-01 | Stop reason: HOSPADM

## 2021-01-01 RX ORDER — FUROSEMIDE 10 MG/ML
40 INJECTION INTRAMUSCULAR; INTRAVENOUS ONCE
Status: DISCONTINUED | OUTPATIENT
Start: 2021-01-01 | End: 2021-01-01

## 2021-01-01 RX ORDER — HEPARIN SODIUM 1000 [USP'U]/ML
2000 INJECTION, SOLUTION INTRAVENOUS; SUBCUTANEOUS PRN
Status: DISCONTINUED | OUTPATIENT
Start: 2021-01-01 | End: 2021-01-01

## 2021-01-01 RX ORDER — HEPARIN SODIUM 1000 [USP'U]/ML
4000 INJECTION, SOLUTION INTRAVENOUS; SUBCUTANEOUS ONCE
Status: COMPLETED | OUTPATIENT
Start: 2021-01-01 | End: 2021-01-01

## 2021-01-01 RX ORDER — DIGOXIN 0.25 MG/ML
INJECTION INTRAMUSCULAR; INTRAVENOUS
Status: COMPLETED
Start: 2021-01-01 | End: 2021-01-01

## 2021-01-01 RX ORDER — LIDOCAINE HYDROCHLORIDE 20 MG/ML
INJECTION, SOLUTION INTRAVENOUS
Status: COMPLETED
Start: 2021-01-01 | End: 2021-01-01

## 2021-01-01 RX ORDER — ASPIRIN 300 MG/1
SUPPOSITORY RECTAL
Status: COMPLETED
Start: 2021-01-01 | End: 2021-01-01

## 2021-01-01 RX ORDER — SODIUM CHLORIDE 9 MG/ML
25 INJECTION, SOLUTION INTRAVENOUS PRN
Status: DISCONTINUED | OUTPATIENT
Start: 2021-01-01 | End: 2021-01-01 | Stop reason: HOSPADM

## 2021-01-01 RX ORDER — DIGOXIN 0.25 MG/ML
500 INJECTION INTRAMUSCULAR; INTRAVENOUS ONCE
Status: COMPLETED | OUTPATIENT
Start: 2021-01-01 | End: 2021-01-01

## 2021-01-01 RX ORDER — CALCIUM CHLORIDE 100 MG/ML
INJECTION INTRAVENOUS; INTRAVENTRICULAR
Status: COMPLETED
Start: 2021-01-01 | End: 2021-01-01

## 2021-01-01 RX ORDER — 0.9 % SODIUM CHLORIDE 0.9 %
500 INTRAVENOUS SOLUTION INTRAVENOUS ONCE
Status: COMPLETED | OUTPATIENT
Start: 2021-01-01 | End: 2021-01-01

## 2021-01-01 RX ORDER — EPINEPHRINE 0.1 MG/ML
SYRINGE (ML) INJECTION
Status: COMPLETED
Start: 2021-01-01 | End: 2021-01-01

## 2021-01-01 RX ADMIN — Medication 100 MCG/HR: at 14:20

## 2021-01-01 RX ADMIN — PROPOFOL 30 MCG/KG/MIN: 10 INJECTION, EMULSION INTRAVENOUS at 00:03

## 2021-01-01 RX ADMIN — SODIUM CHLORIDE, PRESERVATIVE FREE 10 ML: 5 INJECTION INTRAVENOUS at 20:43

## 2021-01-01 RX ADMIN — DIGOXIN 500 MCG: 0.25 INJECTION INTRAMUSCULAR; INTRAVENOUS at 09:50

## 2021-01-01 RX ADMIN — PROPOFOL 30 MCG/KG/MIN: 10 INJECTION, EMULSION INTRAVENOUS at 22:49

## 2021-01-01 RX ADMIN — PROPOFOL 30 MCG/KG/MIN: 10 INJECTION, EMULSION INTRAVENOUS at 07:04

## 2021-01-01 RX ADMIN — VASOPRESSIN 0.04 UNITS/MIN: 20 INJECTION INTRAVENOUS at 09:57

## 2021-01-01 RX ADMIN — ASPIRIN: 300 SUPPOSITORY RECTAL at 14:51

## 2021-01-01 RX ADMIN — PROPOFOL 15 MCG/KG/MIN: 10 INJECTION, EMULSION INTRAVENOUS at 06:12

## 2021-01-01 RX ADMIN — ATORVASTATIN CALCIUM 80 MG: 80 TABLET, FILM COATED ORAL at 21:04

## 2021-01-01 RX ADMIN — Medication 100 MCG/HR: at 04:40

## 2021-01-01 RX ADMIN — AMIODARONE HYDROCHLORIDE 1 MG/MIN: 50 INJECTION, SOLUTION INTRAVENOUS at 02:13

## 2021-01-01 RX ADMIN — Medication 30 MCG/MIN: at 18:12

## 2021-01-01 RX ADMIN — TICAGRELOR 90 MG: 90 TABLET ORAL at 20:43

## 2021-01-01 RX ADMIN — SODIUM CHLORIDE 1 MG: 9 INJECTION, SOLUTION INTRAVENOUS at 11:06

## 2021-01-01 RX ADMIN — Medication 8 MCG/MIN: at 14:38

## 2021-01-01 RX ADMIN — SODIUM BICARBONATE: 84 INJECTION, SOLUTION INTRAVENOUS at 09:21

## 2021-01-01 RX ADMIN — Medication 5 MCG/MIN: at 09:30

## 2021-01-01 RX ADMIN — LIDOCAINE HYDROCHLORIDE 1 MG/MIN: 4 INJECTION, SOLUTION INTRAVENOUS at 09:54

## 2021-01-01 RX ADMIN — Medication 100 MCG/HR: at 14:11

## 2021-01-01 RX ADMIN — HEPARIN SODIUM 4000 UNITS: 1000 INJECTION INTRAVENOUS; SUBCUTANEOUS at 14:54

## 2021-01-01 RX ADMIN — CALCIUM GLUCONATE 2000 MG: 20 INJECTION, SOLUTION INTRAVENOUS at 21:01

## 2021-01-01 RX ADMIN — PROPOFOL 20 MCG/KG/MIN: 10 INJECTION, EMULSION INTRAVENOUS at 17:11

## 2021-01-01 RX ADMIN — TICAGRELOR 90 MG: 90 TABLET ORAL at 21:04

## 2021-01-01 RX ADMIN — PROPOFOL 10 MCG/KG/MIN: 10 INJECTION, EMULSION INTRAVENOUS at 15:48

## 2021-01-01 RX ADMIN — MIDAZOLAM HYDROCHLORIDE 2 MG: 1 INJECTION, SOLUTION INTRAMUSCULAR; INTRAVENOUS at 15:30

## 2021-01-01 RX ADMIN — EPINEPHRINE: 0.1 INJECTION, SOLUTION ENDOTRACHEAL; INTRACARDIAC; INTRAVENOUS at 09:15

## 2021-01-01 RX ADMIN — DIGOXIN: 0.25 INJECTION INTRAMUSCULAR; INTRAVENOUS at 11:54

## 2021-01-01 RX ADMIN — TICAGRELOR 90 MG: 90 TABLET ORAL at 08:54

## 2021-01-01 RX ADMIN — Medication 150 MCG/HR: at 23:13

## 2021-01-01 RX ADMIN — Medication 81 MG: at 08:54

## 2021-01-01 RX ADMIN — FUROSEMIDE 20 MG: 10 INJECTION, SOLUTION INTRAVENOUS at 11:01

## 2021-01-01 RX ADMIN — DIGOXIN: 0.25 INJECTION INTRAMUSCULAR; INTRAVENOUS at 09:55

## 2021-01-01 RX ADMIN — HEPARIN SODIUM 4000 UNITS: 1000 INJECTION, SOLUTION INTRAVENOUS; SUBCUTANEOUS at 14:54

## 2021-01-01 RX ADMIN — TICAGRELOR 90 MG: 90 TABLET ORAL at 09:06

## 2021-01-01 RX ADMIN — MAGNESIUM SULFATE HEPTAHYDRATE 2000 MG: 40 INJECTION, SOLUTION INTRAVENOUS at 09:24

## 2021-01-01 RX ADMIN — PHENYLEPHRINE HYDROCHLORIDE 50 MCG/MIN: 10 INJECTION INTRAVENOUS at 10:20

## 2021-01-01 RX ADMIN — CALCIUM CHLORIDE: 100 INJECTION, SOLUTION INTRAVENOUS; INTRAVENTRICULAR at 09:48

## 2021-01-01 RX ADMIN — AMIODARONE HYDROCHLORIDE 1 MG/MIN: 50 INJECTION, SOLUTION INTRAVENOUS at 17:00

## 2021-01-01 RX ADMIN — VASOPRESSIN 0.04 UNITS/MIN: 20 INJECTION INTRAVENOUS at 02:34

## 2021-01-01 RX ADMIN — LIDOCAINE HYDROCHLORIDE: 20 INJECTION, SOLUTION INTRAVENOUS at 09:50

## 2021-01-01 RX ADMIN — SODIUM CHLORIDE, PRESERVATIVE FREE 10 ML: 5 INJECTION INTRAVENOUS at 21:04

## 2021-01-01 RX ADMIN — MIDAZOLAM HYDROCHLORIDE 2 MG: 2 INJECTION, SOLUTION INTRAMUSCULAR; INTRAVENOUS at 15:30

## 2021-01-01 RX ADMIN — ATORVASTATIN CALCIUM 80 MG: 80 TABLET, FILM COATED ORAL at 20:43

## 2021-01-01 RX ADMIN — ATORVASTATIN CALCIUM 80 MG: 80 TABLET, FILM COATED ORAL at 20:46

## 2021-01-01 RX ADMIN — SODIUM CHLORIDE, PRESERVATIVE FREE 10 ML: 5 INJECTION INTRAVENOUS at 20:46

## 2021-01-01 RX ADMIN — CISATRACURIUM BESYLATE 2 MCG/KG/MIN: 10 INJECTION, SOLUTION INTRAVENOUS at 18:16

## 2021-01-01 RX ADMIN — PROPOFOL 30 MCG/KG/MIN: 10 INJECTION, EMULSION INTRAVENOUS at 10:37

## 2021-01-01 RX ADMIN — Medication 75 MCG/HR: at 16:50

## 2021-01-01 RX ADMIN — Medication 100 MCG/HR: at 00:03

## 2021-01-01 RX ADMIN — TICAGRELOR 90 MG: 90 TABLET ORAL at 20:46

## 2021-01-01 RX ADMIN — SODIUM CHLORIDE 500 ML: 9 INJECTION, SOLUTION INTRAVENOUS at 09:25

## 2021-01-01 RX ADMIN — Medication 100 MCG/HR: at 10:00

## 2021-01-01 RX ADMIN — VASOPRESSIN 0.04 UNITS/MIN: 20 INJECTION INTRAVENOUS at 18:13

## 2021-01-01 RX ADMIN — AMIODARONE HYDROCHLORIDE 1 MG/MIN: 50 INJECTION, SOLUTION INTRAVENOUS at 09:31

## 2021-01-01 RX ADMIN — TICAGRELOR 90 MG: 90 TABLET ORAL at 13:08

## 2021-01-01 RX ADMIN — Medication 81 MG: at 09:06

## 2021-01-01 RX ADMIN — Medication 100 MCG/HR: at 18:40

## 2021-01-01 RX ADMIN — Medication 100 MCG/HR: at 00:14

## 2021-01-01 RX ADMIN — PROPOFOL 30 MCG/KG/MIN: 10 INJECTION, EMULSION INTRAVENOUS at 05:32

## 2021-01-01 RX ADMIN — Medication 150 MCG/HR: at 05:48

## 2021-01-01 ASSESSMENT — PULMONARY FUNCTION TESTS
PIF_VALUE: 17
PIF_VALUE: 25
PIF_VALUE: 24
PIF_VALUE: 17
PIF_VALUE: 17
PIF_VALUE: 23
PIF_VALUE: 25
PIF_VALUE: 19
PIF_VALUE: 17
PIF_VALUE: 9
PIF_VALUE: 17
PIF_VALUE: 17
PIF_VALUE: 24
PIF_VALUE: 17
PIF_VALUE: 13
PIF_VALUE: 17
PIF_VALUE: 21
PIF_VALUE: 17
PIF_VALUE: 17
PIF_VALUE: 19
PIF_VALUE: 17
PIF_VALUE: 29
PIF_VALUE: 17
PIF_VALUE: 17
PIF_VALUE: 12
PIF_VALUE: 20
PIF_VALUE: 17
PIF_VALUE: 18
PIF_VALUE: 17
PIF_VALUE: 24
PIF_VALUE: 17
PIF_VALUE: 17
PIF_VALUE: 19
PIF_VALUE: 17
PIF_VALUE: 17

## 2021-01-01 ASSESSMENT — ENCOUNTER SYMPTOMS: TACHYPNEA: 1

## 2021-08-19 PROBLEM — I21.3 STEMI (ST ELEVATION MYOCARDIAL INFARCTION) (HCC): Status: ACTIVE | Noted: 2021-01-01

## 2021-08-19 NOTE — ED PROVIDER NOTES
[facility]  ED  Emergency Department Encounter  Emergency Medicine Resident     Pt Name: Timi Regalado  MRN: [id number]  Oscar 1960  Date of evaluation: 8/19/21  PCP:  No primary care provider on file. CHIEF COMPLAINT       Chief Complaint   Patient presents with    Cardiac Arrest       HISTORY OFPRESENT ILLNESS  (Location/Symptom, Timing/Onset, Context/Setting, Quality, Duration, Modifying Factors,Severity.)      Timi Regalado is a 62 y.o. male who presents with witnessed V. fib arrest.  Patient was mowing the lawn, bent down to clean out the lawn clippings when he clutched his chest and collapsed. When EMS arrived, the patient was in V. fib arrest. An iGel was placed. He was in and out of arrest for 30 minutes in the field. EKG by EMS showing inferior STEMI. Cath Lab activated at that time prior to arrival.  On arrival, the patient was in bradycardic PEA with active CPR in process. ROSC was obtained after 2 rounds of CPR and epinephrine. Once ROSC achieved, EKG obtained. Unable to obtain past medical history. PAST MEDICAL / SURGICAL / SOCIAL / FAMILY HISTORY      has no past medical history on file. has no past surgical history on file. Social:      Family Hx: No family history on file. Allergies:  Patient has no allergy information on record. Home Medications:  Prior to Admission medications    Not on File       REVIEW OFSYSTEMS    (2-9 systems for level 4, 10 or more for level 5)      Review of Systems   Unable to perform ROS: Patient unresponsive       PHYSICAL EXAM   (up to 7 for level 4, 8 or more forlevel 5)      INITIAL VITALS:   Vitals:    08/19/21 1458   BP: 100/69   Pulse: 111   Resp: 25   Temp:    SpO2: 100%    /69   Pulse 111   Temp 97.6 °F (36.4 °C) (Axillary)   Resp 25   Ht 6' 2\" (1.88 m)   Wt 175 lb (79.4 kg)   SpO2 100%   BMI 22.47 kg/m²       Physical Exam  Vitals reviewed.    Constitutional:       Interventions: He is sedated and intubated. Cervical collar and backboard in place. Comments: DEONNA device actively compressing   HENT:      Head: Atraumatic. Nose: Nose normal.      Mouth/Throat:      Mouth: Mucous membranes are moist.      Pharynx: Oropharynx is clear. Eyes:      Pupils:      Right eye: Pupil is not reactive. Left eye: Pupil is not reactive. Cardiovascular:      Rate and Rhythm: Bradycardia present. Comments: Absent pulses initially  Pulmonary:      Effort: He is intubated. Breath sounds: Normal breath sounds. Abdominal:      General: There is no distension. Palpations: Abdomen is soft. Musculoskeletal:         General: No swelling or signs of injury. Skin:     Coloration: Skin is pale. Neurological:      Mental Status: He is unresponsive. GCS: GCS eye subscore is 1. GCS verbal subscore is 1. GCS motor subscore is 1. Comments: 3T   Psychiatric:         Behavior: Behavior is uncooperative. DIFFERENTIAL  DIAGNOSIS     Initial MDM/Plan: 62 y.o. male who presents with witnessed cardiac arrest while mowing the lawn. Initially was in V. fib arrest in the field for 30 minutes with intermittent ROSC after 6 shocks and multiple rounds of epinephrine. Received amiodarone. Airway secured with igel. During ROSC, EKG showing inferior STEMI, Cath Lab activated at that time. Upon arrival here, patient in PEA arrest.  After 2 rounds of compressions and 1 dose of epinephrine, ROSC achieved. Igel removed and patient intubated with ET tube. Repeat EKG showing inferior STEMI. Patient started on heparin, norepinephrine for pressure support due to hypotension. Patient tachycardic up to 110. Afebrile. No obvious signs of trauma. Suspect patient in V. fib myocardial infarction. Patient to Cath Lab.      DIAGNOSTIC RESULTS / EMERGENCYDEPARTMENT COURSE / MDM     LABS:  Labs Reviewed   ELECTROLYTES PLUS - Abnormal; Notable for the following components:       Result Value    POC Chloride 111 (*)     POC TCO2 21 (*)     All other components within normal limits   HGB/HCT - Abnormal; Notable for the following components:    POC Hemoglobin 13.0 (*)     POC Hematocrit 38 (*)     All other components within normal limits   CBC WITH AUTO DIFFERENTIAL - Abnormal; Notable for the following components:    WBC 21.3 (*)     RBC 3.91 (*)     Hemoglobin 11.6 (*)     .1 (*)     RDW 14.7 (*)     Immature Granulocytes 2 (*)     Monocytes 8 (*)     Eosinophils % 0 (*)     Absolute Immature Granulocyte 0.43 (*)     Segs Absolute 11.71 (*)     Absolute Lymph # 7.46 (*)     Absolute Mono # 1.70 (*)     All other components within normal limits   COMPREHENSIVE METABOLIC PANEL - Abnormal; Notable for the following components:    Glucose 254 (*)     Calcium 7.8 (*)     CO2 14 (*)     Anion Gap 22 (*)     ALT 44 (*)      (*)     Total Protein 5.1 (*)     Albumin 3.0 (*)     All other components within normal limits   TROPONIN - Abnormal; Notable for the following components:    Troponin, High Sensitivity 821 (*)     All other components within normal limits   BRAIN NATRIURETIC PEPTIDE - Abnormal; Notable for the following components:    Pro- (*)     All other components within normal limits   VENOUS BLOOD GAS, POINT OF CARE - Abnormal; Notable for the following components:    pH, Ameya 6.902 (*)     pCO2, Ameya 94.8 (*)     pO2, Ameya 23.2 (*)     HCO3, Venous 18.7 (*)     Negative Base Excess, Ameya 16 (*)     O2 Sat, Ameya 16 (*)     All other components within normal limits   CREATININE W/GFR POINT OF CARE - Abnormal; Notable for the following components:    POC Creatinine 1.27 (*)     All other components within normal limits   LACTIC ACID,POINT OF CARE - Abnormal; Notable for the following components:    POC Lactic Acid 9.36 (*)     All other components within normal limits   POCT GLUCOSE - Abnormal; Notable for the following components:    POC Glucose 263 (*)     All other components within normal limits   CALCIUM, IONIC (POC)   PROTIME-INR   APTT   BLOOD GAS, VENOUS   LACTIC ACID, WHOLE BLOOD   PREVIOUS SPECIMEN   TROPONIN   TROPONIN   TROPONIN   BASIC METABOLIC PANEL   BASIC METABOLIC PANEL   BASIC METABOLIC PANEL   MAGNESIUM   MAGNESIUM   MAGNESIUM   PHOSPHORUS   PHOSPHORUS   PHOSPHORUS   CALCIUM, IONIZED   CALCIUM, IONIZED   CALCIUM, IONIZED   BLOOD GAS, ARTERIAL   BLOOD GAS, ARTERIAL   BLOOD GAS, ARTERIAL   LACTIC ACID, PLASMA   LACTIC ACID, PLASMA   APTT   PROTIME-INR   UREA N (POC)   POCT GLUCOSE         RADIOLOGY:  XR CHEST PORTABLE    Result Date: 8/19/2021  EXAMINATION: ONE XRAY VIEW OF THE CHEST 8/19/2021 3:02 pm COMPARISON: None. HISTORY: ORDERING SYSTEM PROVIDED HISTORY: STEMI, post arrest, post intubation, ng placement TECHNOLOGIST PROVIDED HISTORY: STEMI, post arrest, post intubation, ng placement Reason for Exam: supine Acuity: Acute Type of Exam: Initial FINDINGS: Endotracheal tube approximately 5 cm above the marilou. Gastric tube coiled in the stomach including the tip and side port. Heart size and pulmonary vasculature within normal limits. Spiculated lesion in the right suprahilar region. Lungs otherwise clear. No pneumothorax     1. Tube positioning as discussed 2. No acute abnormality 3. Spiculated lesion in the right suprahilar region which may represent scarring or mass.   Recommend CT chest when clinically feasible     EKG  EKG Interpretation    Interpreted by emergency department physician    Rhythm: sinus tachycardia  Rate: 110-120  Axis: normal  Ectopy: none   Conduction: 1st degree AV block  ST Segments: elevation in  II, III and aVf and depression in  v1, v2, v3, v4, v5, v6 and aVl  T Waves: non specific changes  Q Waves: nonspecific    Clinical Impression: myocardial infarction  inferior    Estrada Newton MD    All EKG's are interpreted by the Emergency Department Physicianwho either signs or Co-signs this chart in the absence of a cardiologist.    PROCEDURES:  Intubation    Date/Time: 8/19/2021 2:32 PM  Performed by: Velvet Arellano MD  Authorized by: Kari Galindo MD     Consent:     Consent obtained:  Emergent situation  Pre-procedure details:     Patient status:  Unresponsive    Paralytics:  None  Procedure details:     Preoxygenation:  NEO/LMA    CPR in progress: no      Intubation method:  Oral    Oral intubation technique:  Video-assisted    Laryngoscope blade: Mac 4    Tube size (mm):  8.0    Tube type:  Cuffed    Number of attempts:  1    Tube visualized through cords: yes    Placement assessment:     ETT to teeth:  23    Tube secured with:  ETT ignacio    Breath sounds:  Equal and absent over the epigastrium    Placement verification: chest rise, CXR verification, direct visualization, equal breath sounds and ETCO2 detector      CXR findings:  ETT in proper place  Post-procedure details:     Patient tolerance of procedure: Tolerated well, no immediate complications        CONSULTS:  IP CONSULT TO CRITICAL CARE  IP CONSULT TO NEUROCRITICAL CARE      FINAL IMPRESSION      1. Cardiac arrest ([facility] [address]    2. ST elevation myocardial infarction (STEMI), unspecified artery ([facility] [address]          DISPOSITION / PLAN     DISPOSITION Admitted 08/19/2021 04:24:31 PM    PATIENT REFERRED TO:  No follow-up provider specified.     DISCHARGE MEDICATIONS:  New Prescriptions    No medications on file       Velvet Arellano MD  Emergency Medicine Resident    (Please note that portions of this note were completed with a voice recognition program.Efforts were made to edit the dictations but occasionally words are mis-transcribed.)        Velvet Arellano MD  Resident  08/19/21 8032

## 2021-08-19 NOTE — ED NOTES
1 minute 45 seconds of CPR via radha   Pulse check.   Return of spontaneous circulation      Brandon Phelan RN  08/19/21 0394

## 2021-08-19 NOTE — CONSULTS
Neuro Critical Care Consult Note    Reason for Consult:  Post cardiac arrest  Requesting Physician:  Dr. Fahad Solomon  Attending Physician:     History Obtained From:  electronic medical record    CHIEF COMPLAINT:       Post Cardiac Arrest    HISTORY OF PRESENT ILLNESS:       The patient is a 62 y.o. male with no known medical history who presents with witnessed Vfib arrest. Per records, patient was mowing the lawn, bent down to clean out lawn clippings when he clutched his chest and collapsed. Upon EMS arrival, patient was in Vfib arrest. He was in and out of arrest for 30 minutes in the field. EKG by EMS showing inferior STEMI. Cath Lab activated at that time prior to arrival.  On arrival, the patient was in bradycardic PEA with active CPR in process. ROSC was obtained after 2 rounds of CPR and epinephrine. He was taken to cath lab emergently. Acute occlusion of mid RCA s/p successful PCI with non-obstructive LAD and minimal LCX disease. EF 50%. Neuro critical care consulted for post arrest prognostication. Hypothermia protocol initiated. On exam, patient was on Versed 10 mg/hr and Fentanyl 25 mcg/hr. Spontaneous movement noted to LUE. Pupils equal and reactive. Weak cough. Localizes bilateral upper extremities, L>R. No movement to bilateral lower extremities. No commands. PAST MEDICAL HISTORY :       Past Medical History:    No past medical history on file. Past Surgical History:    No past surgical history on file.     Social History:   Social History     Socioeconomic History    Marital status: Unknown     Spouse name: Not on file    Number of children: Not on file    Years of education: Not on file    Highest education level: Not on file   Occupational History    Not on file   Tobacco Use    Smoking status: Not on file   Substance and Sexual Activity    Alcohol use: Not on file    Drug use: Not on file    Sexual activity: Not on file   Other mg, 90 mg, Oral, BID  [START ON 8/20/2021] aspirin EC tablet 81 mg, 81 mg, Oral, Daily  ondansetron (ZOFRAN-ODT) disintegrating tablet 4 mg, 4 mg, Oral, Q8H PRN **OR** ondansetron (ZOFRAN) injection 4 mg, 4 mg, Intravenous, Q6H PRN  cisatracurium besylate (NIMBEX) 200 mg in sodium chloride 0.9 % 100 mL infusion, 0.5-10 mcg/kg/min, Intravenous, Continuous  propofol injection, 5-50 mcg/kg/min, Intravenous, Titrated    REVIEW OF SYSTEMS:       Unable to assess due to current condition    PHYSICAL EXAM:       /69   Pulse 65   Temp 97.6 °F (36.4 °C) (Axillary)   Resp 27   Ht 6' 2\" (1.88 m)   Wt 175 lb (79.4 kg)   SpO2 100%   BMI 22.47 kg/m²       CONSTITUTIONAL:  Intubated and sedated. Does not open eyes. Mute due to current condition. Does not follow commands. HEAD:  normocephalic, atraumatic    EYES:  Pupils 2mm equal and reactive bilaterally   ENT:  moist mucous membranes   NECK:  supple, symmetric; cervical collar in place   LUNGS:  Equal air entry bilaterally   CARDIOVASCULAR:  normal s1 / s2   ABDOMEN:  Soft, no rigidity   NEUROLOGIC:  Mental Status:  Intubated, sedated. Does not follow commands. Cranial Nerves:    III: Pupils:  equal, round, reactive to light  Weak cough, Absent gag  Oculocephalic testing deferred with cspine precautions in place    Motor Exam:    Localizes bilateral upper extremities, L>R. No movement to bilateral lower extremities.       SKIN:  no rash        LABS AND IMAGING:     CBC with Differential:    Lab Results   Component Value Date    WBC 21.3 08/19/2021    RBC 3.91 08/19/2021    HGB 11.6 08/19/2021    HCT 40.7 08/19/2021     08/19/2021    .1 08/19/2021    MCH 29.7 08/19/2021    MCHC 28.5 08/19/2021    RDW 14.7 08/19/2021    LYMPHOPCT 35 08/19/2021    MONOPCT 8 08/19/2021    BASOPCT 0 08/19/2021    MONOSABS 1.70 08/19/2021    LYMPHSABS 7.46 08/19/2021    EOSABS 0.00 08/19/2021    BASOSABS 0.00 08/19/2021    DIFFTYPE NOT REPORTED 08/19/2021 BMP:    Lab Results   Component Value Date     08/19/2021    K 4.4 08/19/2021     08/19/2021    CO2 14 08/19/2021    BUN 16 08/19/2021    LABALBU 3.0 08/19/2021    CREATININE 1.12 08/19/2021    CREATININE 1.27 08/19/2021    CALCIUM 7.8 08/19/2021    GFRAA >60 08/19/2021    LABGLOM >60 08/19/2021    GLUCOSE 254 08/19/2021       Radiology Review:    XR CHEST PORTABLE   Final Result   1. Tube positioning as discussed   2. No acute abnormality   3. Spiculated lesion in the right suprahilar region which may represent   scarring or mass. Recommend CT chest when clinically feasible                 ASSESSMENT AND PLAN:       63 yo male with unknown medical history who presents post witnessed vfib arrest. Estimated downtime ~ 30 minutes. Found to have an inferior STEMI taken to Cath lab for 100% acute mid RCA occlusion treated with PCI. Plan for hypothermia protocol. Vfib cardiac arrest in the setting of acute mid RCA occlusion treated successfully with PCI  STEMI  Cardiology following  Hypothermia protocol  Aspirin 81mg QD, Brilinta 90 mg BID    Acute Respiratory failure requiring mechanical ventilation  Pulmonology following    Encephalopathy  Obtain CT head/CT cervical when stable to transport  LTME to evaluate for subclinical seizures while paralyzed  Monitor off AEDs for now  Sedated with Versed and fentanyl  Plan to start Nimbex during hypothermia protocol  NSE x 3  MRI brain @ 72 hours    Remainder of care per primary      DISPOSITION:  [x] To remain ICU  [] OK for out of ICU from Neuro Critical Care standpoint    We will continue to follow along. For any changes in exam or patient status please contact Neuro Critical Care.       SARAH Lopez - CNP  Neuro Critical Care  Pager [phone]  8/19/2021     5:24 PM

## 2021-08-19 NOTE — ED NOTES
[facility]     Rosalina Mathur RN  08/19/21 9448

## 2021-08-19 NOTE — OP NOTE
[facility]    CARDIAC CATHETERIZATION    Date:   8/19/2021  Patient name:  Tab Garcia  Date of admission:  8/19/2021  2:33 PM  MRN:   [id number]  YOB: 1960    Operators:  Primary:  Mo Busch MD    Procedure performed:     [x] Left Heart Catheterization. [] Graft Angiography. [x] Left Ventriculography. [] Right Heart Catheterization. [x] Coronary Angiography. [] Aortic Valve Studies. [x] PCI:      [] Other:     Indication:  [x] STEMI      [] + Stress test  [] ACS      [] + EKG Changes  [] Non Q MI       [] Significant Risk Factors  [] Recurrent Angina             [] Diabetes Mellitus    [] New LBBB      [] Other.  [] CHF / Low EF changes     [] Abnormal CTA / Ca Score      Procedure:  Access:  [x] Femoral  [] Radial  artery       [x] Right  [] Left    Procedure: After informed consent was obtained with explanation of the risks and benefits, patient was brought to the cath lab. The access area was prepped and draped in sterile fashion. 1% lidocaine was used for local block. The artery was cannulated with 6  Fr sheath with brisk arterial blood return. The side port was frequently flushed and aspirated with normal saline. Estimated Blood Loss:  [x] Minimal < 25 cc [] Moderate 25-50 cc  [] >50 cc    Findings:  LMCA: Normal 0% stenosis. LAD: Diffuse irregualrtities 40-50%. Lesion on Mid LAD: 50% stenosis. LCx: Diffuse irregularities 20-30%. RCA: Acute occlusion. Lesion on Mid RCA: 100% stenosis 45 mm length reduced to 0%. Pre     procedure ITZEL 0 flow was noted. Post Procedure ITZEL III flow was     present. Poor runoff was present. The lesion was diagnosed as High Risk     (C). Coronary Tree  Dominance: Right       LV Analysis LV function assessed as:Normal.   The LV gram was performed in the RASCON 30 position. LVEF: 50%. Conclusions:   Acute occlusion of mid RCA.     Successful PCI / Drug Eluting Stent of the mid RCA with restoration of    ITZEL III flow. Non-obstructive LAD and minimal LCX disease. Borderline LV systolic function. Recommendations        Routine Post MI/Stent Orders. Hypothermia protocol. Supportive care. Medical therapy as needed. Risk factor modification. ____________________________________________________________________    History and Risk Factors    [x] Hypertension     [] Family history of CAD  [] Hyperlipidemia     [] Cerebrovascular Disease   [] Prior MI       [] Peripheral Vascular disease   [] Prior PCI              [] Diabetes Mellitus    [] Left Main PCI. [] Currently on Dialysis. [] Prior CABG. [] Currently smoker. [] Cardiac Arrest outside of healthcare facility. [x] Yes    [] No        Witnessed     [] Yes   [x] No     Arrest after arrival of EMS  [] Yes   [x] No     [] Cardiac Arrest at other Facility. [] Yes   [x] No    Pre-Procedure Information. Heart Failure       [] Yes    [x] No        Class  [] I      [] II  [] III    [] IV. New Diagnosis    [] Yes  [] No    HF Type      [] Systolic   [] Diastolic          [] Unknown. Diagnostic Test:   EKG       [] Normal   [x] Abnormal    New antiarrhythmia medications:    [] Yes   [] No   New onset atrial fibrillation / Flutter     [] Yes   [] No   ECG Abnormalities:      [] V. Fib   [] Lexus V. Tach           [] NS V. T   [] New LBBB           [] T.  Inv  []  ST dev > 0.5 mm         [] PVC's freq  [] PVC's infrequent    Stress Test Performed:      [] Yes    [x] No     Type:     [] Stress Echo   [] Exercise Stress Test (no imaging)      [] Stress Nuclear  [] Stress Imaging     Results   [] Negative   [] Positive        [] Indeterminate  [] Unavailable     If Positive/ Risk / Extent of Ischemia:       [] Low  [] Intermediate         [] High  [] Unavailable      Cardiac CTA Performed:     [] Yes    [x] No      Results   [] CAD   [] Non obstructive CAD      [] No CAD   [] Uncertain      [] Unknown   [] Structural Disease. Pre Procedure Medications:   [x] Yes    [] No         [x] ASA   [] Beta Blockers      [] Nitrate   [] Ca Channel Blockers      [] Ranolazine   [] Statin       [] Plavix/Others antiplatelets      Electronically signed on 8/19/2021 at 4:21 PM by:    Rubina Lozoya MD  Fellow, [address]      Physician Statement  I have discussed the case of Mickie Rome including pertinent history and exam findings with the resident. I have seen and examined the patient and the key elements of the encounter have been performed by me. I agree with the assessment, plan and orders as documented by the resident With changes made to the note. Procedure performed by me.     Electronically signed by Martín Saldivar MD on 8/19/2021 at 5:03 PM.    [facility]      [phone]

## 2021-08-19 NOTE — ED NOTES
Pt presents to the ED via [facility]. Pt was mowing grass outside and loading bags of grass when pt went unresponsive. EMS worked on pt for approximately 30 minutes PTA.   Pt got 2 epi, 50mcg fentanyl, 20 etomidate, 8mg versed, amiodarone 300mg and then Chivo Islas 262, RN  08/19/21 Shelbie 34, RN  08/19/21 2131

## 2021-08-19 NOTE — H&P
[facility]    Inpatient Consultation Note               Today's Date: 8/19/2021  Patient Name: Manuel Haines  Date of admission: 8/19/2021  2:33 PM  Patient's age: 62 y.o., 1960  Admission Dx: No admission diagnoses are documented for this encounter. Reason for  Consult:  Inferior STEMI    Requesting Physician: No admitting provider for patient encounter. CHIEF COMPLAINT:     No chief complaint on file. History Obtained From:  electronic medical record    HISTORY OF PRESENT ILLNESS:    62year old male presented to the ED after a V fib arrest. Per discussion with ED resident patient was found to be in V fib arrest in the field. Required 30 mins of CPR. Upon arrival to ED, EKG showed ST elevations in Inferior STEMI. Went into PEA arrest while in ED requiring 2 more rounds of CPR and epinephrine. Repeat EKG on ROSC showed ST elevations in inferior leads. Cath lab activated. Past Medical History:   has no past medical history on file. Past Surgical History:   has no past surgical history on file. Home Medications:    Prior to Admission medications    Not on File        Current Facility-Administered Medications: aspirin chewable tablet 324 mg, 324 mg, Per G Tube, Once  heparin (porcine) injection 4,000 Units, 4,000 Units, Intravenous, PRN  heparin (porcine) injection 2,000 Units, 2,000 Units, Intravenous, PRN  heparin 25,000 units in dextrose 5% 250 mL (premix) infusion, 5-30 Units/kg/hr, Intravenous, Continuous  norepinephrine (LEVOPHED) 16 mg in sodium chloride 0.9 % 250 mL infusion, 2-100 mcg/min, Intravenous, Continuous  midazolam (VERSED) 2 MG/2ML injection, , ,     Allergies:  Patient has no allergy information on record. Social History:    Positive for smoking     Family History: family history is not on file. REVIEW OF SYSTEMS:      Patient intubated and sedated.       PHYSICAL EXAM:      /69   Pulse 111   Temp 97.6 °F (36.4 °C) (Axillary)   Resp 25 Ht 6' 2\" (1.88 m)   Wt 175 lb (79.4 kg)   SpO2 100%   BMI 22.47 kg/m²    No intake or output data in the 24 hours ending 08/19/21 1532      Constitutional and General Appearance:   Intubated and sedated  Respiratory:  No for increased work of breathing. On auscultation: diminished breath sounds bilaterally  Cardiovascular:  Regular S1 and S2. No murmurs  Abdomen:   No masses or tenderness  Bowel sounds present  Extremities:   No Cyanosis or Clubbing   Lower extremity edema: No  Neurological:  Intubated and sedated    DATA:    Diagnostics:    EKG: Inferior STEMI  ECHO:    pending  Cardiac Angiography:   pending    Labs:     CBC:   Recent Labs     08/19/21  1451   WBC 21.3*   HGB 11.6*   HCT 40.7        BMP:   Recent Labs     08/19/21  1434   CREATININE 1.27*   LABGLOM NOT REPORTED     Pro-BNP:    Recent Labs     08/19/21  1451   PROBNP 697*     BNP: No results for input(s): BNP in the last 72 hours. PT/INR:   Recent Labs     08/19/21  1451   PROTIME 11.7   INR 1.1     APTT:  Recent Labs     08/19/21  1451   APTT 26.1       Recent Labs     08/19/21  1451   TROPONINT NOT REPORTED           Patient's Active Problem List  Active Problems:    * No active hospital problems. *  Resolved Problems:    * No resolved hospital problems. *        IMPRESSION:    Inferior STEMI  V fib arrest    RECOMMENDATIONS:  Proceed with coronary angiography +/- PCI  Further recommendations to follow after procedure. Thank you for allowing us to participate in the care of Alicia Oconnell. If you have any questions or concerns, please do not hesitate to contact us. Discussed with Nurse. Milton Aburto MD, M.D. Fellow, [address]         Please note that part of this chart were generated using voice recognition  dictation software.   Although every effort was made to ensure the accuracy of this automated transcription, some errors in transcription may have occurred. Attending Physician Statement  I have discussed the case of Jeanna Tse including pertinent history and exam findings with the resident. I have seen and examined the patient and the key elements of the encounter have been performed by me. I agree with the assessment, plan and orders as documented by the resident With changes made to the note.      Electronically signed by Kwame Farah MD on 8/19/2021 at 5:03 PM.    [facility]      [phone]

## 2021-08-20 NOTE — PROGRESS NOTES
present. Ventilator setting PRVC/18/540/5/30 percent and ABG 7.3 9/37/142/23 temperature corrected. Labs shows sodium 142 potassium 4.1 bicarbonate 20 BUN 20 creatinine 1.04. WBC count 11.3 hemoglobin 11.6 platelet 727. REVIEW OF SYSTEMS:  Unobtainable from patient due to sedation/mechanical ventilation    OBJECTIVE     Ventilator Settings:  Vent Information  $Ventilation: $Subsequent Day  Skin Assessment: Clean, dry, & intact  Suction Catheter Diameter: 14  Vent Type: Servo i  Vent Mode: PRVC  Vt Ordered: 540 mL  Rate Set: 18 bmp  FiO2 : 30 %  SpO2: 100 %  SpO2/FiO2 ratio: 333.33  Sensitivity: 3  PEEP/CPAP: 5  I Time/ I Time %: 0.9 s  Humidification Source: [facility]    VITAL SIGNS:   LAST-  BP 96/77   Pulse 58   Temp (!) 91.4 °F (33 °C) (Esophageal)   Resp 18   Ht 6' 2\" (1.88 m)   Wt 164 lb 14.5 oz (74.8 kg)   SpO2 100%   BMI 21.17 kg/m²   8-24 HR RANGE-  TEMP Temp  Av °F (33.3 °C)  Min: 91.4 °F (33 °C)  Max: 97.6 °F (37.8 °C)   BP Systolic (08EIO), PCJ:227 , Min:86 , RNN:649      Diastolic (34SKN), QVC:73, Min:61, Max:91     PULSE Pulse  Av.4  Min: 42  Max: 121   RR Resp  Av.8  Min: 17  Max: 20   O2 SAT SpO2  Av %  Min: 100 %  Max: 100 %   OXYGEN DELIVERY No data recorded     SYSTEMIC EXAMINATION:   General appearance - Mechanically ventilated, chronically ill-appearing  Mental status - sedated on propofol and fentanyl drip  Eyes - pupils equal and sluggish reactive, sclera anicteric. No corneal reflex  Mouth -orally intubated  Neck - supple, no significant adenopathy, carotids upstroke normal bilaterally, no bruits  Chest -mild subcutaneous emphysema present in upper left chest wall. Breath sounds bilaterally were dimnished to auscultation at bases and distant. There were no wheezes, rhonchi or rales.    Heart - normal rate, regular rhythm, normal S1, S2, no murmurs, rubs, clicks or gallops  Abdomen - soft, nontender, nondistended, no masses or organomegaly  Neurological -no deep tendon reflexes no plantar response and no corneal  Extremities - peripheral pulses decreased but present, no pedal edema, no clubbing or cyanosis  Skin - normal coloration and turgor, no rashes, no suspicious skin lesions noted     DATA REVIEW     Medications: Current Inpatient  Scheduled Meds:   aspirin  324 mg Per G Tube Once    sodium chloride flush  5-40 mL Intravenous 2 times per day    atorvastatin  80 mg Oral Nightly    ticagrelor  90 mg Oral BID    aspirin  81 mg Oral Daily     Continuous Infusions:   norepinephrine 6 mcg/min (08/19/21 2245)    midazolam Stopped (08/19/21 1715)    fentaNYL 150 mcg/hr (08/20/21 0548)    sodium chloride      cisatracurium (NIMBEX) infusion 1 mcg/kg/min (08/20/21 0814)    propofol 30 mcg/kg/min (08/20/21 0532)     INPUT/OUTPUT:  In: 7868 [I.V.:2800; NG/GT:75]  Out: 1035 [Urine:660]  Date 08/20/21 0000 - 08/20/21 2359   Shift 5912-6572 8608-3538 4834-2464 24 Hour Total   INTAKE   I.V.(mL/kg) 7562(85.7)   0293(62.5)   Shift Total(mL/kg) 6704(66.3)   6239(00.3)   OUTPUT   Urine(mL/kg/hr) 285(0.5) 25  310   Emesis/NG output(mL/kg) 375(5)   375(5)   Shift Total(mL/kg) 660(8.8) 25(0.3)  685(9.2)   Weight (kg) 74.8 74.8 74.8 74.8       LABS:-  ABG:   Recent Labs     08/19/21  1807 08/19/21  2222 08/19/21  2231 08/20/21  0350   POCPH 7.342* 7.292* 7.302* 7.337*   POCPCO2 43.6 53.4* 51.7* 44.0   POCPO2 170.8* 135.8* 142.5* 163.7*   POCHCO3 23.7 25.8 25.6 23.5   YKPG5UEH 99* 99* 99* 99*     CBC:   Recent Labs     08/19/21  1451 08/20/21  0355   WBC 21.3* 11.3   HGB 11.6* 11.6*   HCT 40.7 36.9*   .1* 96.1    239   LYMPHOPCT 35  --    RBC 3.91* 3.84*   MCH 29.7 30.2   MCHC 28.5 31.4   RDW 14.7* 14.9*     BMP:   Recent Labs     08/19/21  1751 08/19/21  2224 08/20/21  0355    140 142   K 4.4 4.2 4.1    107 111*   CO2 20 20 20   BUN 19 20 20   CREATININE 1.19 1.19 1.04   GLUCOSE 248* 153* 145*   PHOS 4.1 4.7* 4.5     Liver Function Test:   Recent Labs 08/19/21  1451   PROT 5.1*   LABALBU 3.0*   ALT 44*   *   ALKPHOS 69   BILITOT 0.41     Amylase/Lipase:  No results for input(s): AMYLASE, LIPASE in the last 72 hours. Coagulation Profile:   Recent Labs     08/19/21  1451   INR 1.1   PROTIME 11.7   APTT 26.1     Cardiac Enzymes:  No results for input(s): CKTOTAL, CKMB, CKMBINDEX, TROPONINI in the last 72 hours. Lactic Acid:  Lab Results   Component Value Date    LACTA NOT REPORTED 08/20/2021    LACTA NOT REPORTED 08/19/2021     BNP:   No results found for: BNP  D-Dimer:  No results found for: DDIMER  Others:   No results found for: TSH, K0ZGLWA, Z9KZYTE, THYROIDAB, FT3, T4FREE  No results found for: KAREN, RHEUMFACTOR, SEDRATE, CRP  No results found for: Willetta Martinez  No results found for: IRON, TIBC, FERRITIN  No results found for: SPEP, UPEP  No results found for: PSA, CEA, , [id number],     Microbiology:  No results for input(s): SPECDESC, SPECDESC, SPECIAL, CULTURE, CULTURE, STATUS, ORG, CDIFFTOXPCR, CAMPYLOBPCR, SALMONELLAPC, SHIGAPCR, SHIGELLAPCR, MPNEUG, MPNEUM, LACTOQL in the last 72 hours. Pathology:    Radiology Reports:  XR CHEST PORTABLE   Final Result   Stable support lines      Asymmetric prominence of the right hilum with suggestion of spiculated right   upper lung mass. Malignancy is a differential consideration. Correlative CT   chest suggested      New mild diffuse asymmetric right lung interstitial infiltrate related to   asymmetric edema and/or inflammation         XR CHEST PORTABLE   Final Result   1. Tube positioning as discussed   2. No acute abnormality   3. Spiculated lesion in the right suprahilar region which may represent   scarring or mass. Recommend CT chest when clinically feasible         XR CHEST PORTABLE    (Results Pending)       Echocardiogram:   No results found for this or any previous visit. Cardiac Catheterization:   No results found for this or any previous visit.       ASSESSMENT AND PLAN Assessment:    // Acute hypoxic respiratory failure, requiring invasive mechanical ventilation  // Resuscitated V. fib cardiac arrest  // Acute ST elevation inferior MI  // S/p PCI with KHURRAM to mid RCA  // Suspected anoxic encephalopathy  // Lactic acidosis secondary cardiac arrest, improved  // Acute metabolic and respiratory acidosis, improved  // Leukocytosis secondary to cardiac arrest and MI improving  // Hyperglycemia better  // Bilateral hilar prominence possible hilar mass  // Possible COPD        Plan:    I personally interviewed/examined the patient; reviewed interval history, interpreted all available radiographic and laboratory data at the time of service. Patient is currently on hypothermia protocol and on Nimbex drip. Patient currently sedated with fentanyl and propofol drip. Patient is currently on hemodynamic support with low-dose of Levophed  Continue with current ventilator support he is currently on PRVC/18/580/5/30 percent. Chest x-ray did not show definite pneumothorax but mild subcutaneous emphysema present likely secondary to CPR  Continue antiplatelet, anticoagulation, statins, and beta blockers as per cardiology  Continue lung protective mechanical ventilation, appropriate changes made in ventilator settings  We will give a small IV fluid bolus of 500 mL now. Continue to monitor I/O with a goal of even fluid balance  Continue to monitor CBC, coagulation profile, and BMP  Chemical DVT prophylaxis  Antimicrobials reviewed; no need for antibiotic at this time from pulmonary standpoint  Glycemic control    Discussed with nursing staff treatment plan discussed with  Discussed with respiratory therapist.     The patient is/remains critically ill with illness/injury that acutely impairs one or more vital organ systems, such that there is a high probability of imminent or life threatening deterioration in the patient's condition.  Critical care time of greater than 35 minutes was spent (excluding procedures), in coordination of care during bedside rounds and discussion of patient care in detail, and recommendations of the team were adopted in the plan. Necessity of all invasive devices was also confirmed. Kim Ruiz MD.   Pulmonary and Critical Care Medicine           8/20/2021, 9:02 AM    Please note that this chart was generated using voice recognition Dragon dictation software. Although every effort was made to ensure the accuracy of this automated transcription, some errors in transcription may have occurred.

## 2021-08-20 NOTE — PLAN OF CARE
Problem: OXYGENATION/RESPIRATORY FUNCTION  Goal: Patient will maintain patent airway  8/20/2021 0229 by Terrence Burton RN  Outcome: Ongoing  8/19/2021 2040 by Ezra Perez RCP  Outcome: Ongoing  8/19/2021 1706 by Katharina Woods RCP  Outcome: Ongoing  Goal: Patient will achieve/maintain normal respiratory rate/effort  Description: Respiratory rate and effort will be within normal limits for the patient  8/20/2021 0229 by Terrence Burton RN  Outcome: Ongoing  8/19/2021 2040 by Erica Bedoya RCP  Outcome: Ongoing  8/19/2021 1706 by Katharina Woods RCP  Outcome: Ongoing  Note:   [address] SP02/ABG'S    [x]  IDENTIFY APPROPRIATE OXYGEN THERAPY  [x]   MONITOR SP02/ABG'S AS NEEDED   [x]   PATIENT EDUCATION AS NEEDED        Problem: MECHANICAL VENTILATION  Goal: Patient will maintain patent airway  8/20/2021 0229 by Terrence Burton RN  Outcome: Ongoing  8/19/2021 2040 by Erica Bedoya RCP  Outcome: Ongoing  8/19/2021 1706 by Katharina Woods RCP  Outcome: Ongoing  Note: MECHANICAL VENTILATION     [x]  PROVIDE OPTIMAL VENTILATION  [x]   ASSESS FOR EXTUBATION READINESS  [x]   ASSESS FOR WEANING READINESS  [x]  EXTUBATE AS TOLERATED  [x]  IMPLEMENT ADULT MECHANICAL VENTILATION PROTOCOL  [x]  MAINTAIN ADEQUATE OXYGENATION  [x]  PERFORM SPONTANEOUS WEANING TRIAL AS TOLERATED     Goal: Oral health is maintained or improved  8/20/2021 0229 by Terrence Burton RN  Outcome: Ongoing  8/19/2021 2040 by Ezra Perez RCP  Outcome: Ongoing  8/19/2021 1706 by Katharina Woods RCP  Outcome: Ongoing  Goal: ET tube will be managed safely  8/20/2021 0229 by Terrence Burton RN  Outcome: Ongoing  8/19/2021 2040 by Ezra Perez RCP  Outcome: Ongoing  8/19/2021 1706 by Katharina Woods RCP  Outcome: Ongoing  Goal: Ability to express needs and understand communication  8/20/2021 0229 by Terrence Burton RN  Outcome: Ongoing  8/19/2021 2040 by Erica Bedoya RCP  Outcome: Ongoing  8/19/2021 1706 by Torito Miller RCP  Outcome: Ongoing  Goal: Mobility/activity is maintained at optimum level for patient  8/20/2021 0229 by Homero Benson RN  Outcome: Ongoing  8/19/2021 2040 by Dallas Hdz RCP  Outcome: Ongoing  8/19/2021 1706 by Torito Miller RCP  Outcome: Ongoing     Problem: SKIN INTEGRITY  Goal: Skin integrity is maintained or improved  8/20/2021 0229 by Homero Benson RN  Outcome: Ongoing  8/19/2021 2040 by Author Rosaura Perez RCP  Outcome: Ongoing  8/19/2021 1706 by Torito Miller RCP  Outcome: Ongoing     Problem: Skin Integrity:  Goal: Will show no infection signs and symptoms  Description: Will show no infection signs and symptoms  Outcome: Ongoing  Note: Turn every two hours while in bed, elevate heels while in bed  Goal: Absence of new skin breakdown  Description: Absence of new skin breakdown  Outcome: Ongoing     Problem: Falls - Risk of:  Goal: Will remain free from falls  Description: Will remain free from falls  Outcome: Ongoing  Note: Pt wearing non skid slippers, bed in lowest position and wheel locked, immediate area free of cords that may cause a fall hazard.    Goal: Absence of physical injury  Description: Absence of physical injury  Outcome: Ongoing     Problem: Bleeding:  Goal: Will show no signs and symptoms of excessive bleeding  Description: Will show no signs and symptoms of excessive bleeding  Outcome: Ongoing

## 2021-08-20 NOTE — PROGRESS NOTES
[facility]   Progress Note                    Date:   8/20/2021  Patient name:  Yola Bains  Date of admission:  8/19/2021  2:33 PM  MRN:   [id number]  YOB: 1960  PCP:    No primary care provider on file. Reason for Admission:  Cardiac arrest ([facility] [address] [I46.9]  STEMI (ST elevation myocardial infarction) ([facility] [address] [I21.3]  ST elevation myocardial infarction (STEMI), unspecified artery ([facility] [address] [I21.3]    Subjective:      Clinical Changes / Abnormalities:    Patient seen and examined at bedside. No acute events overnight. Currently intubated and sedated on hypothermia protocol    Urine output in the last 24 hours:     Intake/Output Summary (Last 24 hours) at 8/20/2021 0853  Last data filed at 8/20/2021 0800  Gross per 24 hour   Intake 2875 ml   Output 1035 ml   Net 1840 ml     I/O since admission: +1.8 liters      Medications:   Scheduled Meds:   aspirin  324 mg Per G Tube Once    sodium chloride flush  5-40 mL Intravenous 2 times per day    atorvastatin  80 mg Oral Nightly    ticagrelor  90 mg Oral BID    aspirin  81 mg Oral Daily     Continuous Infusions:   norepinephrine 6 mcg/min (08/19/21 2245)    midazolam Stopped (08/19/21 1715)    fentaNYL 150 mcg/hr (08/20/21 0548)    sodium chloride      cisatracurium (NIMBEX) infusion 1 mcg/kg/min (08/20/21 0814)    propofol 30 mcg/kg/min (08/20/21 0532)     CBC:   Recent Labs     08/19/21  1451 08/20/21  0355   WBC 21.3* 11.3   HGB 11.6* 11.6*    239     BMP:    Recent Labs     08/19/21  1751 08/19/21  2224 08/20/21  0355    140 142   K 4.4 4.2 4.1    107 111*   CO2 20 20 20   BUN 19 20 20   CREATININE 1.19 1.19 1.04   GLUCOSE 248* 153* 145*     Hepatic:   Recent Labs     08/19/21  1451   *   ALT 44*   BILITOT 0.41   ALKPHOS 69     Troponin: No results for input(s): TROPONINI in the last 72 hours.       Recent Labs     08/19/21  1751 08/19/21  2224 08/20/21  0355   TROPONINT NOT REPORTED NOT REPORTED NOT REPORTED     BNP:   Recent Labs     08/19/21  1451   PROBNP 697*      No results for input(s): BNP in the last 72 hours. Lipids:   Recent Labs     08/20/21  0355   CHOL 167   HDL 40*     INR:   Recent Labs     08/19/21  1451   INR 1.1       Objective:   Vitals: BP 96/77   Pulse 58   Temp (!) 91.4 °F (33 °C) (Esophageal)   Resp 18   Ht 6' 2\" (1.88 m)   Wt 164 lb 14.5 oz (74.8 kg)   SpO2 100%   BMI 21.17 kg/m²    Last Recorded Weight:  [unfilled]    Constitutional and General Appearance:    Intubated and sedated  Respiratory:  · No for increased work of breathing. · On auscultation: diminished breath sounds bilaterally  · ETT in place  Cardiovascular:  · The apical impulse is not displaced  · Heart tones are crisp and normal. Regular S1 and S2. No murmurs. Abdomen:   · No masses or tenderness  · Bowel sounds present  Extremities:  ·  No Cyanosis or Clubbing  ·  Lower extremity edema: No  ·  Skin: Warm and dry  Neurological:  · Intubated, sedated and paralyzed    Diagnostic Studies:     EKG: Inferior STEMI  ECHO:   pending    Cardiac Angiography:  LMCA: Normal 0% stenosis. LAD: Diffuse irregualrtities 40-50%.       Lesion on Mid LAD: 50% stenosis.       LCx: Diffuse irregularities 20-30%. RCA: Acute occlusion.         Lesion on Mid RCA: 100% stenosis 45 mm length reduced to 0%. Pre     procedure ITZEL 0 flow was noted. Post Procedure ITZEL III flow was     present. Poor runoff was present. The lesion was diagnosed as High Risk     (C).      Coronary Tree  Dominance: Right       LV Analysis LV function assessed as:Normal.   The LV gram was performed in the RASCON 30 position. LVEF: 50%.       Conclusions:   Acute occlusion of mid RCA.    Successful PCI / Drug Eluting Stent of the mid RCA with restoration of    ITZEL III flow.    Non-obstructive LAD and minimal LCX disease.    Borderline LV systolic function.        Recommendations        Routine Post MI/Stent Orders.    Hypothermia protocol.  Supportive care.    Medical therapy as needed.    Risk factor modification  Assessment / Acute Cardiac Problems:   1. V fib arrest   2. Inferior STEMI s/p PCI to RCA  3. HLD  4. HTN  5. Smoker    Plan of Treatment:   1. Continue hypothermia protocol  2. Continue ASA & brilinta  3. Continue atorvastatin  4. Currently on levophed @ 4. Wean as tolerated  5. Intubated, sedated & paralyzed. Wean per protocol  6. 2D ECHO pending. Follow up on final read. 7. K>4, Mg>2  8. Monitor I&O's, renal function closely. If urine output decreases or Cr uptrends will consult Nephrology. 9. Critical care/Neurology on board, appreciate recommendations. Marino Darnell MD, MD  Fellow, [address]    Attending Physician Statement  I have discussed the care of the patient, including pertinent history and exam findings, with the resident. I have seen and examined the patient and the key elements of all parts of the encounter have been performed by me. I agree with the assessment, plan and orders as documented by the resident.   Levo now on 2 mcq   Still on cooling proticol   Germania Hinojosa MD

## 2021-08-20 NOTE — PROGRESS NOTES
Dr. Chastity Espinoza at [facility]. Fentanyl gtt increased and propofol started per verbal orders.

## 2021-08-20 NOTE — CONSULTS
PULMONARY & CRITICAL CARE MEDICINE CONSULT NOTE     Patient:  Dinora Alonzo  MRN: [id number]  Admit date: 8/19/2021  Primary Care Physician: No primary care provider on file. Consulting Physician: Mathew Douglas MD  CODE Status: Full Code  LOS: 0     SUBJECTIVE     CHIEF COMPLAINT/REASON FOR CONSULT: Cardiac arrest    HISTORY OF PRESENT ILLNESS:  The patient is a 62 y.o. male presented to ED after V. fib cardiac arrest.  He required prolonged resuscitation (about 30 minutes). He is EKG in the ED showed inferior wall ST elevation MI. Patient subsequently went into PEA arrest requiring 2 more rounds of CPR. Patient underwent emergent cath with placement of stent to RCA. Postoperatively, transferred to cardiac ICU. Patient has been initiated on therapeutic hypothermia. No further details of the history available at the time of evaluation in the ICU. At the time of my evaluation patient had been started on fentanyl infusion. He is on invasive mechanical ventilation. He does not respond to any verbal commands and noted to have nonpurposeful movement of the left upper extremity. Chest x-ray done in the ED reported to show a spiculated lesion in the right suprahilar region. PAST MEDICAL HISTORY:    No past medical history on file. PAST SURGICAL HISTORY:    No past surgical history on file. FAMILY HISTORY:   No family history on file. SOCIAL HISTORY:   TOBACCO:   has no history on file for tobacco use. ETOH:  has no history on file for alcohol use. DRUGS: has no history on file for drug use. ALLERGIES:    Not on File      HOME MEDICATIONS:  Prior to Admission medications    Not on File     IMMUNIZATIONS:    There is no immunization history on file for this patient.   REVIEW OF SYSTEMS:  Unobtainable from patient due to sedation/mechanical ventilation    OBJECTIVE     VENTILATOR SETTINGS:  Vent Information  $Ventilation: $Initial Day  Skin Assessment: Clean, dry, & intact  Suction Catheter Diameter: midazolam Stopped (08/19/21 1715)    fentaNYL 150 mcg/hr (08/19/21 1701)    sodium chloride      cisatracurium (NIMBEX) infusion 1 mcg/kg/min (08/19/21 2112)    propofol 20 mcg/kg/min (08/19/21 1711)       INPUT/OUTPUT:  In: 75 [NG/GT:75]  Out: 320 [Urine:320]  Date 08/19/21 0000 - 08/19/21 2359   Shift 0465-5633 4096-5361 2188-7298 24 Hour Total   INTAKE   NG/GT   75(0.9) 75(0.9)   Shift Total(mL/kg)   75(0.9) 75(0.9)   OUTPUT   Urine   320 320   Shift Total(mL/kg)   320(4) 320(4)   Weight (kg)  79.4 79.4 79.4        LABS:  ABGs:   Recent Labs     08/19/21  1807   POCPH 7.342*   POCPCO2 43.6   POCPO2 170.8*   POCHCO3 23.7   LVEF0IRL 99*     CBC:   Recent Labs     08/19/21  1451   WBC 21.3*   HGB 11.6*   HCT 40.7   .1*      LYMPHOPCT 35   RBC 3.91*   MCH 29.7   MCHC 28.5   RDW 14.7*     CRP:   No results for input(s): CRP in the last 72 hours. LDH:   No results for input(s): LDH in the last 72 hours. BMP:   Recent Labs     08/19/21  1434 08/19/21  1451 08/19/21  1751   NA  --  141 139   K  --  4.4 4.4   CL  --  105 107   CO2  --  14* 20   BUN  --  16 19   CREATININE 1.27* 1.12 1.19   GLUCOSE  --  254* 248*   PHOS  --   --  4.1     Liver Function Test:   Recent Labs     08/19/21  1451   PROT 5.1*   LABALBU 3.0*   ALT 44*   *   ALKPHOS 69   BILITOT 0.41     Coagulation Profile:   Recent Labs     08/19/21  1451   INR 1.1   PROTIME 11.7   APTT 26.1     D-Dimer:  No results for input(s): DDIMER in the last 72 hours. Ferritin:    No results for input(s): FERRITIN in the last 72 hours. Lactic Acid:  No results for input(s): LACTA in the last 72 hours. Cardiac Enzymes:  No results for input(s): CKTOTAL, CKMB, CKMBINDEX, TROPONINI in the last 72 hours. Invalid input(s): TROPONIN, HSTROP  BNP/ProBNP:   Recent Labs     08/19/21  1451   PROBNP 697*     Triglycerides:  No results for input(s): TRIG in the last 72 hours.      Microbiology:  Urine Culture:  No components found for: CARI  Blood Culture:  No components found for: CBLOOD, CFUNGUSBL  Sputum Culture:  No components found for: CSPUTUM  No results for input(s): SPECDESC, SPECIAL, CULTURE, STATUS, ORG, CDIFFTOXPCR, CAMPYLOBPCR, SALMONELLAPC, SHIGAPCR, SHIGELLAPCR, MPNEUG, MPNEUM, LACTOQL in the last 72 hours. No results for input(s): SPUTUM, SPECIAL, CULTURE, STATUS, ORG, CDIFFTOXPCR, MPNEUM, MPNEUG in the last 72 hours. Invalid input(s): Fritz Valero, CFUNGUSBL,  [address] [facility]     Pathology:    Radiology Reports:  XR CHEST PORTABLE   Final Result   1. Tube positioning as discussed   2. No acute abnormality   3. Spiculated lesion in the right suprahilar region which may represent   scarring or mass. Recommend CT chest when clinically feasible              Echocardiogram:   No results found for this or any previous visit. ASSESSMENT AND PLAN     Assessment:    // Acute hypoxic respiratory failure, requiring invasive mechanical ventilation  // Resuscitated V. fib cardiac arrest  // Acute ST elevation inferior MI  // S/p PCI with KHURRAM to mid RCA  // Suspected anoxic encephalopathy  // Lactic acidosis, improved  // Acute metabolic and respiratory acidosis, improved  // Leukocytosis  // Hyperglycemia    Plan:    I personally interviewed/examined the patient; reviewed interval history, interpreted all available radiographic and laboratory data at the time of service.      Patient is s/p emergent PCI   He is hemodynamically stable   He is being initiated on therapeutic hypothermia   Currently on fentanyl for infusion, recommended adding propofol   Use paralytic to prevent shivering   Antiplatelet/anticoagulation/statin/beta-blocker therapy as per cardiology recommendations   Continue lung protective mechanical ventilation as per ARDS protocol   Appropriate changes made in ventilator settings   Wean FiO2/PEEP as tolerated   Monitor endotracheal secretions    Obtain X-ray chest as needed    Continue pulmonary toilet, aspiration precautions and bronchodilators   Continue to monitor I/O with a goal of even/negative fluid balance   Stress ulcer prophylaxis   Antimicrobials reviewed; continue to monitor off antimicrobials   Glycemic control appropriate    Patient is critically ill with illness/injury that acutely impairs one or more vital organ systems, such that there is a high probability of imminent or life threatening deterioration in the patient's condition. Critical care time of 35 minutes was spent (excluding procedures), in coordination of care during bedside rounds and discussion of patient care in detail, and recommendations of the team were adopted in the plan. Necessity of all invasive devices was also confirmed. Chloe Champion MD  Pulmonary and Critical Care Medicine           8/19/2021     Please note that this chart was generated using voice recognition Dragon dictation software. Although every effort was made to ensure the accuracy of this automated transcription, some errors in transcription may have occurred.

## 2021-08-20 NOTE — PROGRESS NOTES
Comprehensive Nutrition Assessment    Type and Reason for Visit:  Initial (Vent Check)    Nutrition Recommendations/Plan:   - Continue NPO  - Start nutrition as able  - If tube feeding warranted, recommend peptide based high protein formula (Vital High Protein) with a goal rate of 55 mL/hr while Propofol runs at 14.3 mL/hr (TF will provide 1320 kcal, 115 g protein)  - Will continue to monitor    Nutrition Assessment:  Patient intubated and sedated with Propofol running at 14.3 mL/hr at time of visit. Receiving no nutrition at present. OG tube in place to low-intermittent suction. Labs reviewed include hypernatremia. Meds reviewed. Malnutrition Assessment:  Malnutrition Status:  Insufficient data    Context:  Acute Illness     Findings of the 6 clinical characteristics of malnutrition:  Energy Intake:  Mild decrease in energy intake   Weight Loss:  Unable to assess     Body Fat Loss:  Unable to assess     Muscle Mass Loss:  Unable to assess    Fluid Accumulation:  No significant fluid accumulation     Strength:  Not Performed    Estimated Daily Nutrient Needs:  Energy (kcal):  2388-5792 kcal/d (23-25 kcal/kg); Weight Used for Energy Requirements:  Current (74.8 kg)     Protein (g):  115 g protein/d (1.5 g/kg); Weight Used for Protein Requirements:  Current (74.8 kg)        Fluid (ml/day):  [address] mL fluid/d or per MD; Method Used for Fluid Requirements:  ml/Kg (25-30 mL)      Nutrition Related Findings:  Labs: Na 146 (H). Meds reviewed. Date of last BM unknown. +Absent bowel sounds.       Wounds:   Traumatic wound       Current Nutrition Therapies:    Diet NPO  Additional Calorie Sources:   Propofol @ 14.3 mL/hr = 378 kcal/d    Anthropometric Measures:  · Height: 6' 2\" (188 cm)  · Current Body Weight: 164 lb 14.5 oz (74.8 kg)   · Usual Body Weight:  Unknown - no recent weight hx per EHR     · Ideal Body Weight: 190 lbs; % Ideal Body Weight 86.8 %   · BMI: 21.2  · Adjusted Body Weight:  No Adjustment · BMI Categories: Normal Weight (BMI 18.5-24. 9)       Nutrition Diagnosis:   · Inadequate oral intake related to impaired respiratory function as evidenced by NPO or clear liquid status due to medical condition, intubation    Nutrition Interventions:   Food and/or Nutrient Delivery:  Continue NPO (Start nutrition as able)  Nutrition Education/Counseling:  No recommendation at this time   Coordination of Nutrition Care:  Continue to monitor while inpatient    Goals:  Start nutrition as able       Nutrition Monitoring and Evaluation:   Behavioral-Environmental Outcomes:  None Identified   Food/Nutrient Intake Outcomes:  Diet Advancement/Tolerance, IVF Intake  Physical Signs/Symptoms Outcomes:  Biochemical Data, GI Status, Fluid Status or Edema, Nutrition Focused Physical Findings, Skin, Weight     Discharge Planning:     Too soon to determine     Electronically signed by Nahum Elder RD, LD on 8/20/21 at 12:45 PM EDT  Contact: [phone]

## 2021-08-20 NOTE — PLAN OF CARE
Problem: OXYGENATION/RESPIRATORY FUNCTION  Goal: Patient will maintain patent airway  8/20/2021 1243 by Terri Garsia RCP  Outcome: Ongoing     Problem: OXYGENATION/RESPIRATORY FUNCTION  Goal: Patient will achieve/maintain normal respiratory rate/effort  Description: Respiratory rate and effort will be within normal limits for the patient  8/20/2021 1243 by Terri Garsia RCP  Outcome: Ongoing  Note:   PROVIDE ADEQUATE OXYGENATION WITH ACCEPTABLE SP02/ABG'S    [x]  IDENTIFY APPROPRIATE OXYGEN THERAPY  [x]   MONITOR SP02/ABG'S AS NEEDED   [x]   PATIENT EDUCATION AS NEEDED        Problem: MECHANICAL VENTILATION  Goal: Patient will maintain patent airway  8/20/2021 1243 by Terri Garsia RCP  Outcome: Ongoing  Note: MECHANICAL VENTILATION     [x]  PROVIDE OPTIMAL VENTILATION  [x]   ASSESS FOR EXTUBATION READINESS  [x]   ASSESS FOR WEANING READINESS  [x]  EXTUBATE AS TOLERATED  [x]  IMPLEMENT ADULT MECHANICAL VENTILATION PROTOCOL  [x]  MAINTAIN ADEQUATE OXYGENATION  [x]  PERFORM SPONTANEOUS WEANING TRIAL AS TOLERATED        Problem: MECHANICAL VENTILATION  Goal: Oral health is maintained or improved  8/20/2021 1243 by Terri Garsia RCP  Outcome: Ongoing     Problem: MECHANICAL VENTILATION  Goal: ET tube will be managed safely  8/20/2021 1243 by Terri Garsia RCP  Outcome: Ongoing     Problem: MECHANICAL VENTILATION  Goal: Ability to express needs and understand communication  8/20/2021 1243 by Terri Garsia RCP  Outcome: Ongoing     Problem: MECHANICAL VENTILATION  Goal: Mobility/activity is maintained at optimum level for patient  8/20/2021 1243 by Terri Garsia RCP  Outcome: Ongoing     Problem: SKIN INTEGRITY  Goal: Skin integrity is maintained or improved  8/20/2021 1243 by Terri Garsia RCP  Outcome: Ongoing

## 2021-08-20 NOTE — PLAN OF CARE
Problem: OXYGENATION/RESPIRATORY FUNCTION  Goal: Patient will maintain patent airway  8/19/2021 2040 by Peggy Perez RCP  Outcome: Ongoing     Problem: OXYGENATION/RESPIRATORY FUNCTION  Goal: Patient will achieve/maintain normal respiratory rate/effort  Description: Respiratory rate and effort will be within normal limits for the patient  8/19/2021 2040 by Abad Perez RCP  Outcome: Ongoing     Problem: MECHANICAL VENTILATION  Goal: Patient will maintain patent airway  8/19/2021 2040 by Peggy Perez RCP  Outcome: Ongoing     Problem: MECHANICAL VENTILATION  Goal: Oral health is maintained or improved  8/19/2021 2040 by KARMEN ValerioP  Outcome: Ongoing     Problem: MECHANICAL VENTILATION  Goal: ET tube will be managed safely  8/19/2021 2040 by Abad Perez RCP  Outcome: Ongoing     Problem: MECHANICAL VENTILATION  Goal: Ability to express needs and understand communication  8/19/2021 2040 by Abad Perez RCP  Outcome: Ongoing     Problem: MECHANICAL VENTILATION  Goal: Mobility/activity is maintained at optimum level for patient  8/19/2021 2040 by Abad Perez RCP  Outcome: Ongoing     Problem: SKIN INTEGRITY  Goal: Skin integrity is maintained or improved  8/19/2021 2040 by Isabelle Diaz RCP  Outcome: Ongoing

## 2021-08-20 NOTE — PROGRESS NOTES
Pt admitted to room 1028 from cath lab. Versed and levo gtts infusing, angiomax gtt completed. Sheath to right fem artery and cooling cath to right fem vein. Pt moving all extremities non-purposeful.

## 2021-08-20 NOTE — PLAN OF CARE
Problem: OXYGENATION/RESPIRATORY FUNCTION  Goal: Patient will maintain patent airway  8/20/2021 1922 by Lance Burns RN  Outcome: Ongoing     Problem: OXYGENATION/RESPIRATORY FUNCTION  Goal: Patient will achieve/maintain normal respiratory rate/effort  Description: Respiratory rate and effort will be within normal limits for the patient  8/20/2021 1922 by Lance Burns RN  Outcome: Ongoing     Problem: MECHANICAL VENTILATION  Goal: Patient will maintain patent airway  8/20/2021 1922 by Lance Burns RN  Outcome: Ongoing     Problem: MECHANICAL VENTILATION  Goal: Oral health is maintained or improved  8/20/2021 1922 by Lance Burns RN  Outcome: Ongoing     Problem: MECHANICAL VENTILATION  Goal: ET tube will be managed safely  8/20/2021 1922 by Lance Burns RN  Outcome: Ongoing     Problem: MECHANICAL VENTILATION  Goal: Ability to express needs and understand communication  8/20/2021 1922 by Lance Burns RN  Outcome: Ongoing     Problem: MECHANICAL VENTILATION  Goal: Mobility/activity is maintained at optimum level for patient  8/20/2021 1922 by Lance Burns RN  Outcome: Ongoing     Problem: SKIN INTEGRITY  Goal: Skin integrity is maintained or improved  8/20/2021 1922 by Lance Burns RN  Outcome: Ongoing     Problem: Skin Integrity:  Goal: Will show no infection signs and symptoms  Description: Will show no infection signs and symptoms  Outcome: Ongoing     Problem: Skin Integrity:  Goal: Absence of new skin breakdown  Description: Absence of new skin breakdown  Outcome: Ongoing     Problem: Falls - Risk of:  Goal: Will remain free from falls  Description: Will remain free from falls  Outcome: Ongoing     Problem: Falls - Risk of:  Goal: Absence of physical injury  Description: Absence of physical injury  Outcome: Ongoing     Problem: Bleeding:  Goal: Will show no signs and symptoms of excessive bleeding  Description: Will show no signs and symptoms of excessive bleeding  Outcome: Ongoing     Problem: Nutrition  Goal: Optimal nutrition therapy  8/20/2021 1922 by Henny Goodson RN  Outcome: Ongoing

## 2021-08-20 NOTE — PROGRESS NOTES
Dr. Carlota Valdez with neuro critical care rounded on patient updated on patient status orders for patient to be started on LTME.  Will continue to monitor    Mona Palacios RN

## 2021-08-20 NOTE — PROGRESS NOTES
PS Dr. Vidal Koch about patient decreased U/O the last couple hours, orders received to give 500 cc NS this hr the 250 cc and 250 cc the following two hours.

## 2021-08-20 NOTE — PROGRESS NOTES
Dr. Bryson Fierro at bedside updated on vitals, labs meds and chest xray. Orders to decrease fentanyl infusion to 100mcg/hour and give 500 ns bolus.  Will continue to monitor    Danita Aschoff, RN

## 2021-08-20 NOTE — CONSULTS
Neuro Critical Care Consult Note    Reason for Consult:  Post cardiac arrest  Requesting Physician:  Dr. Fahad Solomon  Attending Physician:     History Obtained From:  electronic medical record    CHIEF COMPLAINT:       Post Cardiac Arrest    HISTORY OF PRESENT ILLNESS:       The patient is a 62 y.o. male with no known medical history who presents with witnessed Vfib arrest. Per records, patient was mowing the lawn, bent down to clean out lawn clippings when he clutched his chest and collapsed. Upon EMS arrival, patient was in Vfib arrest. He was in and out of arrest for 30 minutes in the field. EKG by EMS showing inferior STEMI. Cath Lab activated at that time prior to arrival.  On arrival, the patient was in bradycardic PEA with active CPR in process. ROSC was obtained after 2 rounds of CPR and epinephrine. He was taken to cath lab emergently. Acute occlusion of mid RCA s/p successful PCI with non-obstructive LAD and minimal LCX disease. EF 50%. Neuro critical care consulted for post arrest prognostication. 8/19: Hypothermia protocol initiated. On exam, patient was on Versed 10 mg/hr and Fentanyl 25 mcg/hr. Spontaneous movement noted to LUE. Pupils equal and reactive. Weak cough. Localizes bilateral upper extremities, L>R. No movement to bilateral lower extremities. No commands. Last 24 hours: LTME today. Patient sedated on fentanyl and propofol, on Nimbex for control of shivering. Neuron specific enolase in process. PAST MEDICAL HISTORY :       Past Medical History:    No past medical history on file. Past Surgical History:    No past surgical history on file.     Social History:   Social History     Socioeconomic History    Marital status: Unknown     Spouse name: Not on file    Number of children: Not on file    Years of education: Not on file    Highest education level: Not on file   Occupational History    Not on file   Tobacco Use    Smoking status: Not on file   Substance and Sexual Activity    Alcohol use: Not on file    Drug use: Not on file    Sexual activity: Not on file   Other Topics Concern    Not on file   Social History Narrative    Not on file     Social Determinants of Health     Financial Resource Strain:     Difficulty of Paying Living Expenses:    Food Insecurity:     Worried About Running Out of Food in the Last Year:     [address] in the Last Year:    Transportation Needs:     Lack of Transportation (Medical):  Lack of Transportation (Non-Medical):    Physical Activity:     Days of Exercise per Week:     Minutes of Exercise per Session:    Stress:     Feeling of Stress :    Social Connections:     Frequency of Communication with Friends and Family:     Frequency of Social Gatherings with Friends and Family:     Attends Jewish Services:     Active Member of Clubs or Organizations:     Attends Club or Organization Meetings:     Marital Status:    Intimate Partner Violence:     Fear of Current or Ex-Partner:     Emotionally Abused:     Physically Abused:     Sexually Abused:        Family History:   No family history on file. Allergies:  Patient has no allergy information on record.     Home Medications:  Prior to Admission medications    Not on File       Current Medications:   Current Facility-Administered Medications: magnesium sulfate 1000 mg in dextrose 5% 100 mL IVPB, 1,000 mg, Intravenous, PRN  potassium chloride 20 mEq/50 mL IVPB (Central Line), 20 mEq, Intravenous, PRN  potassium chloride (KLOR-CON M) extended release tablet 40 mEq, 40 mEq, Oral, PRN **OR** potassium bicarb-citric acid (EFFER-K) effervescent tablet 40 mEq, 40 mEq, Oral, PRN  aspirin chewable tablet 324 mg, 324 mg, Per G Tube, Once  norepinephrine (LEVOPHED) 16 mg in sodium chloride 0.9 % 250 mL infusion, 2-100 mcg/min, Intravenous, Continuous  midazolam (VERSED) 1 mg/mL in D5W infusion, 1-10 mg/hr, Intravenous, Continuous  fentaNYL 20 mcg/mL Infusion, 12.5-200 mcg/hr, Intravenous, Continuous  sodium chloride flush 0.9 % injection 5-40 mL, 5-40 mL, Intravenous, 2 times per day  sodium chloride flush 0.9 % injection 5-40 mL, 5-40 mL, Intravenous, PRN  0.9 % sodium chloride infusion, 25 mL, Intravenous, PRN  acetaminophen (TYLENOL) tablet 650 mg, 650 mg, Oral, Q4H PRN  atorvastatin (LIPITOR) tablet 80 mg, 80 mg, Oral, Nightly  ticagrelor (BRILINTA) tablet 90 mg, 90 mg, Oral, BID  aspirin EC tablet 81 mg, 81 mg, Oral, Daily  ondansetron (ZOFRAN-ODT) disintegrating tablet 4 mg, 4 mg, Oral, Q8H PRN **OR** ondansetron (ZOFRAN) injection 4 mg, 4 mg, Intravenous, Q6H PRN  cisatracurium besylate (NIMBEX) 200 mg in sodium chloride 0.9 % 100 mL infusion, 0.5-10 mcg/kg/min, Intravenous, Continuous  propofol injection, 5-50 mcg/kg/min, Intravenous, Titrated    REVIEW OF SYSTEMS:       Unable to assess due to current condition    PHYSICAL EXAM:       BP 99/80   Pulse 61   Temp (!) 91.6 °F (33.1 °C)   Resp 18   Ht 6' 2\" (1.88 m)   Wt 164 lb 14.5 oz (74.8 kg)   SpO2 100%   BMI 21.17 kg/m²       CONSTITUTIONAL:  Intubated and sedated. Does not open eyes. Mute due to current condition. Does not follow commands. HEAD:  normocephalic, atraumatic    EYES:  Pupils 2mm equal and reactive bilaterally   ENT:  moist mucous membranes   NECK:  supple, symmetric; cervical collar in place   LUNGS:  Equal air entry bilaterally   CARDIOVASCULAR:  normal s1 / s2   ABDOMEN:  Soft, no rigidity   NEUROLOGIC:  Mental Status:  Intubated, sedated. On Nimbex   commands. Pupils 2 mm, equal, round, reactive to ligjt                 Motor Exam:  Can not complete motor exam due to chemical paralysis with Nimbex.         SKIN:  no rash        LABS AND IMAGING:     CBC with Differential:    Lab Results   Component Value Date    WBC 11.3 08/20/2021    RBC 3.84 08/20/2021    HGB 11.6 08/20/2021    HCT 36.9 08/20/2021     08/20/2021    MCV 96.1 08/20/2021    MCH 30.2 08/20/2021    MCHC 31.4 08/20/2021    RDW 14.9 08/20/2021    LYMPHOPCT 35 08/19/2021    MONOPCT 8 08/19/2021    BASOPCT 0 08/19/2021    MONOSABS 1.70 08/19/2021    LYMPHSABS 7.46 08/19/2021    EOSABS 0.00 08/19/2021    BASOSABS 0.00 08/19/2021    DIFFTYPE NOT REPORTED 08/19/2021     BMP:    Lab Results   Component Value Date     08/20/2021    K 4.2 08/20/2021     08/20/2021    CO2 20 08/20/2021    BUN 20 08/20/2021    LABALBU 3.0 08/19/2021    CREATININE 0.96 08/20/2021    CALCIUM 7.9 08/20/2021    GFRAA >60 08/20/2021    LABGLOM >60 08/20/2021    GLUCOSE 119 08/20/2021       Radiology Review:    XR CHEST PORTABLE   Final Result   Stable support lines      Asymmetric prominence of the right hilum with suggestion of spiculated right   upper lung mass. Malignancy is a differential consideration. Correlative CT   chest suggested      New mild diffuse asymmetric right lung interstitial infiltrate related to   asymmetric edema and/or inflammation         XR CHEST PORTABLE   Final Result   1. Tube positioning as discussed   2. No acute abnormality   3. Spiculated lesion in the right suprahilar region which may represent   scarring or mass. Recommend CT chest when clinically feasible         XR CHEST PORTABLE    (Results Pending)           ASSESSMENT AND PLAN:       63 yo male with unknown medical history who presents post witnessed vfib arrest. Estimated downtime ~ 30 minutes. Found to have an inferior STEMI taken to Cath lab for 100% acute mid RCA occlusion treated with PCI.      Patient achieved target temperature off 33 C at ~2100 8/19     Vfib cardiac arrest in the setting of acute mid RCA occlusion treated successfully with PCI  STEMI  Cardiology following  Hypothermia protocol  Aspirin 81mg QD, Brilinta 90 mg BID    Acute Respiratory failure requiring mechanical ventilation  Pulmonology following    Encephalopathy  Obtain CT head/CT cervical when stable to transport  LTME to evaluate for subclinical seizures while paralyzed  Monitor off AEDs for now  Sedated with Versed and fentanyl  Plan to start Nimbex during hypothermia protocol  NSE x 3  MRI brain @ 72 hours    Remainder of care per primary      DISPOSITION:  [x] To remain ICU  [] OK for out of ICU from Neuro Critical Care standpoint    We will continue to follow along. For any changes in exam or patient status please contact Neuro Critical Care.       Fabrizio Welch MD  Neuro Critical Care  Pager [phone]  8/20/2021     12:10 PM

## 2021-08-20 NOTE — PLAN OF CARE
Nutrition Problem #1: Inadequate oral intake  Intervention: Food and/or Nutrient Delivery: Continue NPO (Start nutrition as able)  Nutritional Goals: Start nutrition as able

## 2021-08-21 NOTE — PROGRESS NOTES
Dr. Verónica Valle with neuro critical care rounded on patient he said he would be back to examine patient after paralytic is out of patients system.     Sabino Devi RN

## 2021-08-21 NOTE — PROGRESS NOTES
PROGRESS NOTE     Reevaluated patient off of Nimbex and sedation at 1248. Family at bedside. Patient intubated and on vent, C collar in place. He is awake, alert, following commands, and tracking with eyes. PERRL. Patient gives thumbs up bilaterally, no movement in bilateral LE to pain. Normal tone. Patient nods his head when asked if he can feel pain in lower extremities. Sensation intact in all 4 extremities. Family updated on EEG findings and plan for MRI brain and C spine tomorrow.      Cal Graff MD PGY2  8/21/2021 1:06 PM

## 2021-08-21 NOTE — PROGRESS NOTES
Updated Dr. Autumn Doss of pt most recent ABG results and low urine output. Awaiting responds will continue to monitor.

## 2021-08-21 NOTE — CONSULTS
BS elevated & mild decrease in GFR otherwise stable chloride infusion, PRN  acetaminophen (TYLENOL) tablet 650 mg, Q4H PRN  atorvastatin (LIPITOR) tablet 80 mg, Nightly  ticagrelor (BRILINTA) tablet 90 mg, BID  aspirin EC tablet 81 mg, Daily  ondansetron (ZOFRAN-ODT) disintegrating tablet 4 mg, Q8H PRN   Or  ondansetron (ZOFRAN) injection 4 mg, Q6H PRN  cisatracurium besylate (NIMBEX) 200 mg in sodium chloride 0.9 % 100 mL infusion, Continuous  propofol injection, Titrated        Allergies:  Patient has no allergy information on record. Social History:   Social History     Socioeconomic History    Marital status: Unknown     Spouse name: Not on file    Number of children: Not on file    Years of education: Not on file    Highest education level: Not on file   Occupational History    Not on file   Tobacco Use    Smoking status: Not on file   Substance and Sexual Activity    Alcohol use: Not on file    Drug use: Not on file    Sexual activity: Not on file   Other Topics Concern    Not on file   Social History Narrative    Not on file     Social Determinants of Health     Financial Resource Strain:     Difficulty of Paying Living Expenses:    Food Insecurity:     Worried About Running Out of Food in the Last Year:     [address] in the Last Year:    Transportation Needs:     Lack of Transportation (Medical):      Lack of Transportation (Non-Medical):    Physical Activity:     Days of Exercise per Week:     Minutes of Exercise per Session:    Stress:     Feeling of Stress :    Social Connections:     Frequency of Communication with Friends and Family:     Frequency of Social Gatherings with Friends and Family:     Attends Buddhism Services:     Active Member of Clubs or Organizations:     Attends Club or Organization Meetings:     Marital Status:    Intimate Partner Violence:     Fear of Current or Ex-Partner:     Emotionally Abused:     Physically Abused:     Sexually Abused:        Family History:   No family history on file.    Review of Systems:    Unable to obtain from pt on vent      Objective:  CURRENT TEMPERATURE:  Temp: 97.3 °F (36.3 °C)  MAXIMUM TEMPERATURE OVER 24HRS:  Temp (24hrs), Av.4 °F (34.7 °C), Min:91.4 °F (33 °C), Max:97.3 °F (36.3 °C)    CURRENT RESPIRATORY RATE:  Resp: 30  CURRENT PULSE:  Pulse: 96 (Simultaneous filing. User may not have seen previous data.)  CURRENT BLOOD PRESSURE:  BP: 102/82  24HR BLOOD PRESSURE RANGE:  Systolic (19UVW), YHB:043 , Min:92 , LAF:882   ; Diastolic (29LJX), ILL:15, Min:77, Max:94    24HR INTAKE/OUTPUT:      Intake/Output Summary (Last 24 hours) at 2021 1325  Last data filed at 2021 0700  Gross per 24 hour   Intake 3011.11 ml   Output 520 ml   Net 2491.11 ml     Patient Vitals for the past 96 hrs (Last 3 readings):   Weight   21 0400 164 lb 14.5 oz (74.8 kg)   21 1441 175 lb (79.4 kg)     Physical Exam:  General appearance:sedated on vent  Skin: warm and dry, no rash or erythema  Eyes: conjunctivae normal and sclera anicteric  ENT: : ETT in place  Neck: trachea midline, neck supple  Pulmonary: no wheezing or rhonchi. No rales heard.   Cardiovascular: Normal S1 & S2,  No S3   Abdomen: soft, obese, ND hypoactive bs  Extremities:no cyanosis, clubbing, no edema    Labs:   CBC:  Recent Labs     21  1451 21  0355 21  0616   WBC 21.3* 11.3 6.8   RBC 3.91* 3.84* 4.08*   HGB 11.6* 11.6* 12.1*   HCT 40.7 36.9* 40.3*   .1* 96.1 98.8   MCH 29.7 30.2 29.7   MCHC 28.5 31.4 30.0   RDW 14.7* 14.9* 15.1*    239 184   MPV 11.9 10.9 11.4      BMP:   Recent Labs     21  1554 21  2215 21  0616    145* 143   K 4.1 4.3 4.6   * 113* 112*   CO2 21 18* 19*   BUN  19   CREATININE 0.82 0.89 0.90   GLUCOSE 114* 120* 114*   CALCIUM 8.0* 8.1* 8.1*        Phosphorus:    Recent Labs     21  1005 21  1554 215   PHOS 4.4 4.1 3.9     Magnesium:   Recent Labs     21  1005 21  1554 08/20/21  2215   MG 2.1 2.0 2.0     Albumin:   Recent Labs     08/19/21  1451   LABALBU 3.0*       IRON:  No results found for: IRON  Iron Saturation:  No components found for: PERCENTFE  TIBC:  No results found for: TIBC  FERRITIN:  No results found for: FERRITIN  SPEP:   Lab Results   Component Value Date    PROT 5.1 08/19/2021     UPEP: No results found for: TPU         Radiology:  Reviewed as available    Assessment:  1. JOHN initially near anuric this am, now non-oliguric with response to diuretics. Baseline Cr presumed normal.   JOHN secondary to ischemic ATN s/p V-fib arrest.   2. V fib arrest  3. STEMI PCI to RCA  4. HF EF 35%  5. HTN  6. HLD  7. Smoker        Plan:  1. Will Check Renal Ultrasound to r/o element of obstruction and to assess the kidney size/echotexture. 2. Urine studies as ordered. 3. Keep MAP greater than 65  4. Keep NS at 75cc/hr  5. Will assess in am for need for further diuretics. 6. Avoid nephrotoxins, strict I/O daily weights, maintain Concepcion. 7. Daily BMP  Following  Will discuss with Dr. Gaurav Matos  Thank you for the consultation. Please do not hesitate to call with questions. Electronically signed by KEVIN Mcclain on 8/21/2021 at 1:25 PM      Attending Physician Statement  I have discussed the care of Sabrina Roberts, including pertinent history and exam findings with the NP I have reviewed the key elements of all parts of the encounter with the np. Note was updated and recorded changes were made I have seen and examined the patient with the np. I agree with the assessment and plan and status of the problem list as documented. Patient was seen and examined as well as condition discussed with the team.  Reason for consultation with decrease in urine output. His renal functions are stable. Patient received fluid bolus and followed by Lasix. In last 2 hours there has been significant improvement in his urine output.   Addiitionally I recommend okay to use Lasix

## 2021-08-21 NOTE — PROCEDURES
LONG-TERM EEG-VIDEO [id number]    Patient: Latoya Armnado  Age: 62 y.o. MRN: [id number]    Referring Physician: No ref. provider found  History: The patient is a 62 y.o. male who presented breakthrough seizure/encephalopathy. This long-term video-EEG monitoring study was performed to determine the nature of the patient's clinical events. The patient is on neuroactive medications.    Latoya Armando   Current Facility-Administered Medications   Medication Dose Route Frequency Provider Last Rate Last Admin    magnesium sulfate 1000 mg in dextrose 5% 100 mL IVPB  1,000 mg Intravenous PRN Misha Ortega MD        potassium chloride 20 mEq/50 mL IVPB (Central Line)  20 mEq Intravenous PRN Misha Ortega MD        potassium chloride (KLOR-CON M) extended release tablet 40 mEq  40 mEq Oral PRN Misha Ortega MD        Or    potassium bicarb-citric acid (EFFER-K) effervescent tablet 40 mEq  40 mEq Oral PRN Misha Ortega MD        aspirin chewable tablet 324 mg  324 mg Per G Tube Once May MD Denita        norepinephrine (LEVOPHED) 16 mg in sodium chloride 0.9 % 250 mL infusion  2-100 mcg/min Intravenous Continuous May MD Denita   Stopped at 08/20/21 1308    midazolam (VERSED) 1 mg/mL in D5W infusion  1-10 mg/hr Intravenous Continuous Misha Ortega MD   Stopped at 08/19/21 1715    fentaNYL 20 mcg/mL Infusion  12.5-200 mcg/hr Intravenous Continuous Misha Ortega MD 5 mL/hr at 08/21/21 0003 100 mcg/hr at 08/21/21 0003    sodium chloride flush 0.9 % injection 5-40 mL  5-40 mL Intravenous 2 times per day Misha Ortega MD   10 mL at 08/20/21 2104    sodium chloride flush 0.9 % injection 5-40 mL  5-40 mL Intravenous PRN Misha Ortega MD        0.9 % sodium chloride infusion  25 mL Intravenous PRN Misha Ortega MD        acetaminophen (TYLENOL) tablet 650 mg  650 mg Oral Q4H PRN Misha Ortega MD        atorvastatin (LIPITOR) tablet 80 mg  80 mg Oral Nightly Rubina Lozoya MD   80 mg at 08/20/21 2104    ticagrelor (BRILINTA) tablet 90 mg  90 mg Oral BID Rubina Lozoya MD   90 mg at 08/20/21 2104    aspirin EC tablet 81 mg  81 mg Oral Daily Rubina Lozoya MD   81 mg at 08/20/21 5633    ondansetron (ZOFRAN-ODT) disintegrating tablet 4 mg  4 mg Oral Q8H PRN Rubina Lozoya MD        Or    ondansetron [facility]) injection 4 mg  4 mg Intravenous Q6H PRN Rubina Lozoya MD        cisatracurium besylate (NIMBEX) 200 mg in sodium chloride 0.9 % 100 mL infusion  0.5-10 mcg/kg/min Intravenous Continuous Rubina Lozoya MD 3.6 mL/hr at 08/20/21 1614 1.5 mcg/kg/min at 08/20/21 1614    propofol injection  5-50 mcg/kg/min Intravenous Titrated Rubina Lozoya MD 14.3 mL/hr at 08/21/21 0704 30 mcg/kg/min at 08/21/21 0700     Technical Description: This is a 21-channel digital EEG recording with time-locked video. Electrodes were placed in accordance with the 10-20 International System of Electrode Placement. Single lead EKG monitoring was included. Baseline EEG Recording:  A formal baseline EEG recording was not obtained. Day 1 - 8/20/21, starting at 2017    Interictal EEG Samples: The background activity consisted of 4 to 5 Hz of polymorphic delta theta activity of 15 to 20 µV. During the first half of the recording, there were occasional periods of diffuse attenuation lasting for 1 to 2 seconds. The background was symmetric. As recording progressed, the background appeared more continuous. The background showed reactivity with stimulation. The EKG channel revealed no abnormalities. Ictal EEG Recording / Patient Events: During this period the patient had no events or seizures. Summary: During this day of recording no events were recorded. The interictal EEG was abnormal due to diffuse polymorphic delta and theta activity suggesting moderate to severe encephalopathy gradually improved as recording progressed.  Monitoring was continued in order to record the patient's typical events. The EKG channel revealed no abnormalities. Day 2 - 8/21/21, reviewed through 7:30 am    Interictal EEG Samples: On today's recording, the background appeared continuous and previously seen attenuation was not seen. Ictal EEG Recording / Patient Events: During this period the patient had no events or seizures. Summary: During this day of recording no events were recorded. The interictal EEG was abnormal due to diffuse polymorphic delta and theta activity suggesting moderate to severe encephalopathy, slightly improved from yesterday. The presence of state change and reactivity are good prognostic factors. Monitoring was continued in order to record the patients typical events. The EKG channel revealed no abnormalities. Efren Curran MD  Diplomate, American Board of Psychiatry and Neurology  Diplomate, American Board of Clinical Neurophysiology  Diplomate, American Board of Epilepsy     Please note this is a preliminary report and updated daily. The final report will have a summary of behavior and electrographic findings with clinical correlation.

## 2021-08-21 NOTE — PLAN OF CARE
Problem: OXYGENATION/RESPIRATORY FUNCTION  Goal: Patient will maintain patent airway  8/21/2021 1845 by Carmen Phillip RN  Outcome: Ongoing  8/21/2021 0751 by Nemo Jaime RCP  Outcome: Ongoing  Goal: Patient will achieve/maintain normal respiratory rate/effort  Description: Respiratory rate and effort will be within normal limits for the patient  8/21/2021 1845 by Carmen Phillip RN  Outcome: Ongoing  8/21/2021 0751 by Nemo Jaime RCP  Outcome: Ongoing     Problem: MECHANICAL VENTILATION  Goal: Patient will maintain patent airway  8/21/2021 1845 by Carmen Phillip RN  Outcome: Ongoing  8/21/2021 0751 by Nemo Jaime RCP  Outcome: Ongoing  Goal: Oral health is maintained or improved  8/21/2021 1845 by Carmen Phillip RN  Outcome: Ongoing  8/21/2021 0751 by Nemo Jaime RCP  Outcome: Ongoing  Goal: ET tube will be managed safely  8/21/2021 1845 by Carmen Phillip RN  Outcome: Ongoing  8/21/2021 0751 by Nemo Jaime RCP  Outcome: Ongoing  Goal: Ability to express needs and understand communication  8/21/2021 1845 by Carmen Phillip RN  Outcome: Ongoing  8/21/2021 0751 by Nemo Jaime RCP  Outcome: Ongoing  Goal: Mobility/activity is maintained at optimum level for patient  8/21/2021 1845 by Carmen Phillip RN  Outcome: Ongoing  8/21/2021 0751 by Nemo Jaime RCP  Outcome: Ongoing     Problem: SKIN INTEGRITY  Goal: Skin integrity is maintained or improved  8/21/2021 1845 by Carmen Phillip RN  Outcome: Ongoing  8/21/2021 0751 by Nemo Jaime RCP  Outcome: Ongoing     Problem: Skin Integrity:  Goal: Will show no infection signs and symptoms  Description: Will show no infection signs and symptoms  8/21/2021 1845 by Carmen Phillip RN  Outcome: Ongoing  8/21/2021 0751 by Nemo Jaime RCP  Outcome: Ongoing  Goal: Absence of new skin breakdown  Description: Absence of new skin breakdown  8/21/2021 1845 by Pierrette Libman ORLANDO Hawley  Outcome: Ongoing  8/21/2021 0751 by Elvis Haynes RCP  Outcome: Ongoing     Problem: Falls - Risk of:  Goal: Will remain free from falls  Description: Will remain free from falls  8/21/2021 1845 by Nicol Mckeon RN  Outcome: Ongoing  8/21/2021 0751 by Elvis Haynes RCP  Outcome: Ongoing  Goal: Absence of physical injury  Description: Absence of physical injury  8/21/2021 1845 by Nicol Mckeon RN  Outcome: Ongoing  8/21/2021 0751 by Elvis Haynes RCP  Outcome: Ongoing     Problem: Bleeding:  Goal: Will show no signs and symptoms of excessive bleeding  Description: Will show no signs and symptoms of excessive bleeding  8/21/2021 1845 by Nicol Mckeon RN  Outcome: Ongoing  8/21/2021 0751 by Elvis Haynes RCP  Outcome: Ongoing     Problem: Nutrition  Goal: Optimal nutrition therapy  8/21/2021 1845 by Nicol Mckeon RN  Outcome: Ongoing  8/21/2021 0751 by Elvis Haynes RCP  Outcome: Ongoing     Problem: Non-Violent Restraints  Goal: Removal from restraints as soon as assessed to be safe  Outcome: Ongoing  Goal: No harm/injury to patient while restraints in use  Outcome: Ongoing  Goal: Patient's dignity will be maintained  Outcome: Ongoing

## 2021-08-21 NOTE — PLAN OF CARE
Problem: OXYGENATION/RESPIRATORY FUNCTION  Goal: Patient will maintain patent airway  8/20/2021 2015 by Ezra Perez RCP  Outcome: Ongoing     Problem: OXYGENATION/RESPIRATORY FUNCTION  Goal: Patient will achieve/maintain normal respiratory rate/effort  Description: Respiratory rate and effort will be within normal limits for the patient  8/20/2021 2015 by Dawood Perez RCP  Outcome: Ongoing     Problem: MECHANICAL VENTILATION  Goal: Patient will maintain patent airway  8/20/2021 2015 by Ezra Perez RCP  Outcome: Ongoing     Problem: MECHANICAL VENTILATION  Goal: Oral health is maintained or improved  8/20/2021 2015 by Dawood Perez RCP  Outcome: Ongoing     Problem: MECHANICAL VENTILATION  Goal: ET tube will be managed safely  8/20/2021 2015 by Dawood Perez RCP  Outcome: Ongoing     Problem: MECHANICAL VENTILATION  Goal: Ability to express needs and understand communication  8/20/2021 2015 by Erica Bedoya RCP  Outcome: Ongoing     Problem: MECHANICAL VENTILATION  Goal: Mobility/activity is maintained at optimum level for patient  8/20/2021 2015 by Dawood Perez RCP  Outcome: Ongoing     Problem: SKIN INTEGRITY  Goal: Skin integrity is maintained or improved  8/20/2021 2015 by Erica Bedoya RCP  Outcome: Ongoing

## 2021-08-21 NOTE — PLAN OF CARE
Problem: OXYGENATION/RESPIRATORY FUNCTION  Goal: Patient will maintain patent airway  8/21/2021 0751 by KARMEN PradoP  Outcome: Ongoing  8/20/2021 2015 by KARMEN LedezmaP  Outcome: Ongoing  8/20/2021 1922 by Maribeth Foy RN  Outcome: Ongoing  Goal: Patient will achieve/maintain normal respiratory rate/effort  Description: Respiratory rate and effort will be within normal limits for the patient  8/21/2021 0751 by Meseret Hebert RCP  Outcome: Ongoing  8/20/2021 2015 by Ko Zapata RCP  Outcome: Ongoing  8/20/2021 1922 by Maribeth Foy RN  Outcome: Ongoing     Problem: MECHANICAL VENTILATION  Goal: Patient will maintain patent airway  8/21/2021 0751 by Meseret Hebert RCP  Outcome: Ongoing  8/20/2021 2015 by Ko Zapata RCP  Outcome: Ongoing  8/20/2021 1922 by Maribeth Foy RN  Outcome: Ongoing  Goal: Oral health is maintained or improved  8/21/2021 0751 by Meseret Hebert RCP  Outcome: Ongoing  8/20/2021 2015 by Ko Zapata RCP  Outcome: Ongoing  8/20/2021 1922 by Maribeth Foy RN  Outcome: Ongoing  Goal: ET tube will be managed safely  8/21/2021 0751 by Meseret Hebert RCP  Outcome: Ongoing  8/20/2021 2015 by Ko Zapata RCP  Outcome: Ongoing  8/20/2021 1922 by Maribeth Foy RN  Outcome: Ongoing  Goal: Ability to express needs and understand communication  8/21/2021 0751 by Meseret Hebert RCP  Outcome: Ongoing  8/20/2021 2015 by Ko Zapata RCP  Outcome: Ongoing  8/20/2021 1922 by Maribeth Foy, RN  Outcome: Ongoing  Goal: Mobility/activity is maintained at optimum level for patient  8/21/2021 0751 by KARMEN PradoP  Outcome: Ongoing  8/20/2021 2015 by KARMEN LedezmaP  Outcome: Ongoing  8/20/2021 1922 by Maribeth Foy RN  Outcome: Ongoing     Problem: SKIN INTEGRITY  Goal: Skin integrity is maintained or improved  8/21/2021 0751 by Meseret Hebert RCP  Outcome: Ongoing  8/20/2021 2015 by Deana Perez RCP  Outcome: Ongoing  8/20/2021 1922 by Piero Wilhelm RN  Outcome: Ongoing

## 2021-08-21 NOTE — PROGRESS NOTES
[facility]   Progress Note                    Date:   8/21/2021  Patient name:  Andre Hernandez  Date of admission:  8/19/2021  2:33 PM  MRN:   [id number]  YOB: 1960  PCP:    No primary care provider on file. Reason for Admission:  Cardiac arrest ([facility] [I46.9]  STEMI (ST elevation myocardial infarction) ([facility] [I21.3]  ST elevation myocardial infarction (STEMI), unspecified artery ([facility] [I21.3]    Subjective:      Clinical Changes / Abnormalities:    Patient seen and examined at bedside. No acute events overnight.    Rewarming to be completed at 9 am.     Urine output in the last 24 hours:     Intake/Output Summary (Last 24 hours) at 8/21/2021 0809  Last data filed at 8/21/2021 0700  Gross per 24 hour   Intake 3011.11 ml   Output 635 ml   Net 2376.11 ml     I/O since admission: +4 liters      Medications:   Scheduled Meds:   aspirin  324 mg Per G Tube Once    sodium chloride flush  5-40 mL Intravenous 2 times per day    atorvastatin  80 mg Oral Nightly    ticagrelor  90 mg Oral BID    aspirin  81 mg Oral Daily     Continuous Infusions:   norepinephrine Stopped (08/20/21 1308)    midazolam Stopped (08/19/21 1715)    fentaNYL 100 mcg/hr (08/21/21 0003)    sodium chloride      cisatracurium (NIMBEX) infusion 1.5 mcg/kg/min (08/20/21 1614)    propofol 30 mcg/kg/min (08/21/21 0704)     CBC:   Recent Labs     08/19/21  1451 08/20/21  0355 08/21/21  0616   WBC 21.3* 11.3 6.8   HGB 11.6* 11.6* 12.1*    239 184     BMP:    Recent Labs     08/20/21  1554 08/20/21  2215 08/21/21  0616    145* 143   K 4.1 4.3 4.6   * 113* 112*   CO2 21 18* 19*   BUN 20 19 19   CREATININE 0.82 0.89 0.90   GLUCOSE 114* 120* 114*     Hepatic:   Recent Labs     08/19/21  1451   *   ALT 44*   BILITOT 0.41   ALKPHOS 69         Recent Labs     08/20/21  1554 08/20/21  2215 08/21/21  0416   TROPONINT NOT REPORTED NOT REPORTED NOT REPORTED     BNP:   Recent Labs     08/19/21  1451 PROBNP 697*     Lipids:   Recent Labs     08/20/21  0355   CHOL 167   HDL 40*     INR:   Recent Labs     08/19/21  1451   INR 1.1       Objective:   Vitals: /88   Pulse 95   Temp (!) 91.4 °F (33 °C)   Resp 23   Ht 6' 2\" (1.88 m)   Wt 164 lb 14.5 oz (74.8 kg)   SpO2 99%   BMI 21.17 kg/m²    Last Recorded Weight:  [unfilled]    Constitutional and General Appearance:    Intubated and sedated  Respiratory:  · No for increased work of breathing. · On auscultation: diminished breath sounds bilaterally  · ETT in place  Cardiovascular:  · The apical impulse is not displaced  · Heart tones are crisp and normal. Regular S1 and S2. No murmurs. Abdomen:   · No masses or tenderness  · Bowel sounds present  Extremities:  ·  No Cyanosis or Clubbing  ·  Lower extremity edema: No  ·  Skin: Warm and dry  Neurological:  · Intubated, sedated and paralyzed    Diagnostic Studies:     EKG: Inferior STEMI  ECHO:   pending    Cardiac Angiography:  LMCA: Normal 0% stenosis. LAD: Diffuse irregualrtities 40-50%.       Lesion on Mid LAD: 50% stenosis.       LCx: Diffuse irregularities 20-30%. RCA: Acute occlusion.         Lesion on Mid RCA: 100% stenosis 45 mm length reduced to 0%. Pre     procedure ITZEL 0 flow was noted. Post Procedure ITZEL III flow was     present. Poor runoff was present. The lesion was diagnosed as High Risk     (C).      Coronary Tree  Dominance: Right       LV Analysis LV function assessed as:Normal.   The LV gram was performed in the RASCON 30 position. LVEF: 50%.          Conclusions:   Acute occlusion of mid RCA.    Successful PCI / Drug Eluting Stent of the mid RCA with restoration of    ITZEL III flow.    Non-obstructive LAD and minimal LCX disease.    Borderline LV systolic function.        Recommendations        Routine Post MI/Stent Orders.    Hypothermia protocol.    Supportive care.    Medical therapy as needed.    Risk factor modification    Echo 8/20/21:  Left ventricle is normal in size. Global left ventricular systolic function  is moderately reduced. Calculated ejection fraction 35% by Guerrero's method. Visually estimated EF  30-35%. Akinetic basal inferoseptal, basal anteroseptal basal inferolateral, and  basal anterolateral segments. Dyskinetic basal inferior. Right ventricular function appears reduced. Aortic valve is mildly sclerotic but opens well. No aortic insufficiency. Assessment / Acute Cardiac Problems:   1. V fib arrest   2. Inferior STEMI s/p PCI to RCA  3. HFrEF 35%  4. HLD  5. HTN  6. Smoker    Plan of Treatment:   1. Rewarming to be completed this AM. Critical care following. 2. BP and HR improving. 3. On sedation and paralytics. 4. Continue ASA & brilinta  5. Continue atorvastatin  6. Pressors weaned. 7. K>4, Mg>2  8. Monitor I&O's, renal function closely. Lasix low dose ordered. If renal function deteriorates will consider nephrology consult. Currently Cr normal. If good response to lasix will initiate ACEI. 9. Critical care/Neurology on board, appreciate recommendations. Eden Nolasco MD, MD  Fellow, [address]        Attending Physician Statement  I have discussed the case of Jennifer Sers including pertinent history and exam findings with the resident. I have seen and examined the patient and the key elements of the encounter have been performed by me. I agree with the assessment, plan and orders as documented by the resident With changes made to the note.      Electronically signed by Samantha Jordan MD on 8/21/2021 at 3:12 PM.    [facility]      [phone]

## 2021-08-21 NOTE — PROGRESS NOTES
Dr. Richi Mata at bedside updated on dr. Sathish Ross recommendation to consult nephrology, asked him to call the consult for nephrology.  Will continue to monitor    Renetta Guadalupe RN

## 2021-08-21 NOTE — PROGRESS NOTES
Dr. Jenn Lawson at bedside for consult updated on labs, urine output and vitals.  He said he would put in orders    Fatmata Landaverde RN

## 2021-08-21 NOTE — CARE COORDINATION
Case Management Initial Discharge Plan  Mia Ortiz,             Spoke with pt's wife over the phone to discuss discharge plans. Information verified: address, contacts, phone number, , insurance Yes  Insurance Provider: [employer]    Emergency Contact/Next of Kin name & number: Spouse Cedric Wilson  [phone]  Who are involved in patient's support system? Spouse and family members    PCP: No primary care provider on file. Date of last visit: Will need appt closer to D/C      Discharge Planning    Living Arrangements:  Spouse/Significant Other, Children, Family Members     Home has 2 stories  3 stairs to climb to get into front door, 16 stairs to climb to reach second floor  Location of bedroom/bathroom in home 2nd    Patient able to perform ADL's:Independent    Current Services (outpatient & in home) none  DME equipment: none  DME provider: n/a    Is patient receiving oral anticoagulation therapy? No        Potential Assistance Needed:  Home Care, [facility]    Patient agreeable to home care: most likely if needed  Freedom of choice provided:  no    Prior SNF/Rehab Placement and Facility: none  Agreeable to SNF/Rehab: will question if need arises  Freedom of choice provided: n/a     Evaluation: no    Expected Discharge date:       Patient expects to be discharged to: If home: is the family and/or caregiver wiling & able to provide support at home? yes  Who will be providing this support? Spouse and family members*    Follow Up Appointment: Best Day/ Time:      Transportation provider: children  Transportation arrangements needed for discharge: No    Readmission Risk              Risk of Unplanned Readmission:  11             Does patient have a readmission risk score greater than 14?: No  If yes, follow-up appointment must be made within 7 days of discharge.      Goals of Care: breathing on own, heart revascularized      Educated pt on transitional options, provided freedom of choice and are agreeable with plan      Discharge Plan: Goal is home with wife and daughter along with other family support    Currently I/S w/FiO2 50%, on fent/nimbex/propofol/versed gtts. Watch progression and needs.           Electronically signed by Eduin Bay RN on 8/21/21 at 9:36 AM EDT

## 2021-08-21 NOTE — CONSULTS
Neuro Critical Care Consult Note    Reason for Consult:  Post cardiac arrest  Requesting Physician:  Dr. Queta Copeland  Attending Physician:     History Obtained From:  electronic medical record    CHIEF COMPLAINT:       Post Cardiac Arrest    HISTORY OF PRESENT ILLNESS:       The patient is a 62 y.o. male with no known medical history who presents with witnessed Vfib arrest. Per records, patient was mowing the lawn, bent down to clean out lawn clippings when he clutched his chest and collapsed. Upon EMS arrival, patient was in Vfib arrest. He was in and out of arrest for 30 minutes in the field. EKG by EMS showing inferior STEMI. Cath Lab activated at that time prior to arrival.  On arrival, the patient was in bradycardic PEA with active CPR in process. ROSC was obtained after 2 rounds of CPR and epinephrine. He was taken to cath lab emergently. Acute occlusion of mid RCA s/p successful PCI with non-obstructive LAD and minimal LCX disease. EF 50%. Neuro critical care consulted for post arrest prognostication. 8/19: Hypothermia protocol initiated. On exam, patient was on Versed 10 mg/hr and Fentanyl 25 mcg/hr. Spontaneous movement noted to LUE. Pupils equal and reactive. Weak cough. Localizes bilateral upper extremities, L>R. No movement to bilateral lower extremities. No commands. 8/20: LTME started today at 9PM. Patient sedated on fentanyl and propofol, on Nimbex for control of shivering. Neuron specific enolase in process. Last 24 hrs:   Patient seen and examined at the bedside. Currently, in the process of rewarming. On Fentanyl and propofol gtt, paralzyed with Nimbex. LTME was started 9 PM last night. Neuron specific enolase still in process. Rewarming planned to be completed at 9 am. MRI tomorrow 9/22     PAST MEDICAL HISTORY :       Past Medical History:    No past medical history on file.     Past Surgical History:    No past surgical history on effervescent tablet 40 mEq, 40 mEq, Oral, PRN  aspirin chewable tablet 324 mg, 324 mg, Per G Tube, Once  norepinephrine (LEVOPHED) 16 mg in sodium chloride 0.9 % 250 mL infusion, 2-100 mcg/min, Intravenous, Continuous  midazolam (VERSED) 1 mg/mL in D5W infusion, 1-10 mg/hr, Intravenous, Continuous  fentaNYL 20 mcg/mL Infusion, 12.5-200 mcg/hr, Intravenous, Continuous  sodium chloride flush 0.9 % injection 5-40 mL, 5-40 mL, Intravenous, 2 times per day  sodium chloride flush 0.9 % injection 5-40 mL, 5-40 mL, Intravenous, PRN  0.9 % sodium chloride infusion, 25 mL, Intravenous, PRN  acetaminophen (TYLENOL) tablet 650 mg, 650 mg, Oral, Q4H PRN  atorvastatin (LIPITOR) tablet 80 mg, 80 mg, Oral, Nightly  ticagrelor (BRILINTA) tablet 90 mg, 90 mg, Oral, BID  aspirin EC tablet 81 mg, 81 mg, Oral, Daily  ondansetron (ZOFRAN-ODT) disintegrating tablet 4 mg, 4 mg, Oral, Q8H PRN **OR** ondansetron (ZOFRAN) injection 4 mg, 4 mg, Intravenous, Q6H PRN  cisatracurium besylate (NIMBEX) 200 mg in sodium chloride 0.9 % 100 mL infusion, 0.5-10 mcg/kg/min, Intravenous, Continuous  propofol injection, 5-50 mcg/kg/min, Intravenous, Titrated    REVIEW OF SYSTEMS:       Unable to assess due to current condition    PHYSICAL EXAM:       /88   Pulse 93   Temp (!) 91.4 °F (33 °C)   Resp 18   Ht 6' 2\" (1.88 m)   Wt 164 lb 14.5 oz (74.8 kg)   SpO2 99%   BMI 21.17 kg/m²       CONSTITUTIONAL:  Intubated and sedated, paralyzed on Nimbex   HEAD:  normocephalic, atraumatic    EYES:  Pupils 2mm equal and reactive bilaterally   ENT:  moist mucous membranes   NECK:  supple, symmetric; cervical collar in place   LUNGS:  Equal air entry bilaterally   CARDIOVASCULAR:  normal s1 / s2   ABDOMEN:  Soft, no rigidity   NEUROLOGIC:  Mental Status:  Intubated, sedated. On Nimbex   commands. Pupils 2 mm, equal, round, reactive to ligjt                 Motor Exam:  Can not complete motor exam due to chemical paralysis with Nimbex.         SKIN:  no rash        LABS AND IMAGING:     CBC with Differential:    Lab Results   Component Value Date    WBC 6.8 08/21/2021    RBC 4.08 08/21/2021    HGB 12.1 08/21/2021    HCT 40.3 08/21/2021     08/21/2021    MCV 98.8 08/21/2021    MCH 29.7 08/21/2021    MCHC 30.0 08/21/2021    RDW 15.1 08/21/2021    LYMPHOPCT 35 08/19/2021    MONOPCT 8 08/19/2021    BASOPCT 0 08/19/2021    MONOSABS 1.70 08/19/2021    LYMPHSABS 7.46 08/19/2021    EOSABS 0.00 08/19/2021    BASOSABS 0.00 08/19/2021    DIFFTYPE NOT REPORTED 08/19/2021     BMP:    Lab Results   Component Value Date     08/21/2021    K 4.6 08/21/2021     08/21/2021    CO2 19 08/21/2021    BUN 19 08/21/2021    LABALBU 3.0 08/19/2021    CREATININE 0.90 08/21/2021    CALCIUM 8.1 08/21/2021    GFRAA >60 08/21/2021    LABGLOM >60 08/21/2021    GLUCOSE 114 08/21/2021       Radiology Review:    XR CHEST PORTABLE   Final Result   Stable support lines      Asymmetric prominence of the right hilum with suggestion of spiculated right   upper lung mass. Malignancy is a differential consideration. Correlative CT   chest suggested      New mild diffuse asymmetric right lung interstitial infiltrate related to   asymmetric edema and/or inflammation         XR CHEST PORTABLE   Final Result   1. Tube positioning as discussed   2. No acute abnormality   3. Spiculated lesion in the right suprahilar region which may represent   scarring or mass. Recommend CT chest when clinically feasible         XR CHEST PORTABLE    (Results Pending)   XR CHEST PORTABLE    (Results Pending)           ASSESSMENT AND PLAN:       63 yo male with unknown medical history who presents post witnessed vfib arrest. Estimated downtime ~ 30 minutes. Found to have an inferior STEMI taken to Cath lab for 100% acute mid RCA occlusion treated with PCI.      Patient achieved target temperature off 33 C at ~2100 8/19     Vfib cardiac arrest in the setting of acute mid RCA occlusion treated successfully with PCI  STEMI  Cardiology following  Hypothermia protocol  Aspirin 81mg QD, Brilinta 90 mg BID    Acute Respiratory failure requiring mechanical ventilation  Pulmonology following    Encephalopathy  Obtain CT head/CT cervical when stable to transport  LTME to evaluate for subclinical seizures while paralyzed- LTME report pending   Monitor off AEDs for now  Sedated with Propofol and fentanyl  On Nimbex  NSE x 3- in process   MRI brain @ 72 hours    Remainder of care per primary      DISPOSITION:  [x] To remain ICU  [] OK for out of ICU from Neuro Critical Care standpoint    We will continue to follow along. For any changes in exam or patient status please contact Neuro Critical Care.       Joan Abel MD  Neuro Critical Care  Pager [phone]  8/21/2021     7:17 AM

## 2021-08-21 NOTE — PROGRESS NOTES
PULMONARY & CRITICAL CARE MEDICINE PROGRESS NOTE     Patient:  Jose Sandoval  MRN: [id number]  Admit date: 8/19/2021  Primary Care Physician: No primary care provider on file. Consulting Physician: Charisse Garcias MD  CODE Status: Full Code  LOS: 2    SUBJECTIVE     CHIEF COMPLAINT/REASON FOR INITIAL CONSULT:    Cardiac arrest/acute respiratory failure ventilator management. BRIEF HOSPITAL COURSE:  The patient is a 62 y.o. male presented to ED after V. fib cardiac arrest.  He required prolonged resuscitation (about 30 minutes). He is EKG in the ED showed inferior wall ST elevation MI. Patient subsequently went into PEA arrest requiring 2 more rounds of CPR. Patient underwent emergent cath with placement of stent to RCA. Postoperatively, transferred to cardiac ICU. Patient has been initiated on therapeutic hypothermia. No further details of the history available at the time of evaluation in the ICU. At the time of my evaluation patient had been started on fentanyl infusion. He is on invasive mechanical ventilation. He does not respond to any verbal commands and noted to have nonpurposeful movement of the left upper extremity.   Chest x-ray done in the ED reported to show a spiculated lesion in the right suprahilar region.       INTERVAL HISTORY:  08/21/21  Overnight events noted seen, chart seen, labs and medications reviewed discussed with nursing staff and respiratory therapist.  Patient has been rewarmed  Patient is on fentanyl and propofol  No longer on Nimbex  He is no longer on pressors  Does not follow commands due to sedation  Family was at the bedside and was updated    REVIEW OF SYSTEMS:  Unobtainable from patient due to sedation/mechanical ventilation    OBJECTIVE     Ventilator Settings:  Vent Information  $Ventilation: $Subsequent Day  Skin Assessment: Clean, dry, & intact  Suction Catheter Diameter: 14  Equipment Changed: HME  Vent Type: Servo i  Vent Mode: PRVC  Vt Ordered: 540 mL  Rate Set: 23 bmp  FiO2 : 50 %  SpO2: 99 %  SpO2/FiO2 ratio: 198  Sensitivity: 3  PEEP/CPAP: 8  I Time/ I Time %: 0.9 s  Humidification Source: [facility]    VITAL SIGNS:   LAST-  /88   Pulse 98   Temp (!) 91.4 °F (33 °C)   Resp 23   Ht 6' 2\" (1.88 m)   Wt 164 lb 14.5 oz (74.8 kg)   SpO2 99%   BMI 21.17 kg/m²   8-24 HR RANGE-  TEMP Temp  Av.5 °F (33.1 °C)  Min: 91.4 °F (33 °C)  Max: 91.6 °F (08.8 °C)   BP Systolic (80RQY), BDA:523 , Min:92 , VYD:438      Diastolic (16DOL), RAR:90, Min:77, Max:94     PULSE Pulse  Av.7  Min: 59  Max: 99   RR Resp  Av.8  Min: 18  Max: 23   O2 SAT SpO2  Av.2 %  Min: 96 %  Max: 100 %   OXYGEN DELIVERY No data recorded     SYSTEMIC EXAMINATION:   General appearance - Mechanically ventilated, chronically ill-appearing  Mental status - sedated on propofol and fentanyl drip  Eyes - pupils equal and sluggish reactive, sclera anicteric. No corneal reflex  Mouth -orally intubated  Neck - supple, no significant adenopathy  Chest -mild subcutaneous emphysema present in upper left chest wall. Breath sounds bilaterally were dimnished to auscultation at bases and distant. There were no wheezes, rhonchi or rales.   Patient has flail chest  Heart - normal rate, regular rhythm, normal S1, S2, no murmurs, rubs, clicks or gallops  Abdomen - soft, nontender, nondistended, no masses or organomegaly  Neurological -sedated and does not follow any commands  Extremities -no pedal edema, no clubbing or cyanosis  Skin - normal coloration and turgor, no rashes, no suspicious skin lesions noted     DATA REVIEW     Medications: Current Inpatient  Scheduled Meds:   aspirin  324 mg Per G Tube Once    sodium chloride flush  5-40 mL Intravenous 2 times per day    atorvastatin  80 mg Oral Nightly    ticagrelor  90 mg Oral BID    aspirin  81 mg Oral Daily     Continuous Infusions:   norepinephrine Stopped (21 1308)    midazolam Stopped (21 1715)    fentaNYL 100 mcg/hr (21 0003)    sodium chloride      cisatracurium (NIMBEX) infusion Stopped (08/21/21 0908)    propofol 30 mcg/kg/min (08/21/21 0704)     INPUT/OUTPUT:  In: 3011.1 [I.V.:3011.1]  Out: 435 [Urine:435]  Date 08/21/21 0000 - 08/21/21 2359   Shift 0770-2978 4370-2109 7686-5256 24 Hour Total   INTAKE   I.V.(mL/kg) 1729. 8(23.1)   1729. 8(23.1)   Shift Total(mL/kg) 1729. 8(23.1)   1729. 8(23.1)   OUTPUT   Urine(mL/kg/hr) 195(0.3)   195   Shift Total(mL/kg) 195(2.6)   195(2.6)   Weight (kg) 74.8 74.8 74.8 74.8       LABS:-  ABG:   Recent Labs     08/20/21  0350 08/20/21  1055 08/20/21  1629 08/20/21  2335 08/21/21  0416   POCPH 7.337* 7.290* 7.269* 7.271* 7.211*   POCPCO2 44.0 49.3* 51.4* 48.6* 46.7   POCPO2 163.7* 83.9 86.3 76.0* 73.3*   POCHCO3 23.5 23.7 23.5 22.4 18.7*   LVLM3XBL 99* 95 95 93* 91*     CBC:   Recent Labs     08/19/21  1451 08/20/21  0355 08/21/21  0616   WBC 21.3* 11.3 6.8   HGB 11.6* 11.6* 12.1*   HCT 40.7 36.9* 40.3*   .1* 96.1 98.8    239 184   LYMPHOPCT 35  --   --    RBC 3.91* 3.84* 4.08*   MCH 29.7 30.2 29.7   MCHC 28.5 31.4 30.0   RDW 14.7* 14.9* 15.1*     BMP:   Recent Labs     08/20/21  1005 08/20/21  1005 08/20/21  1554 08/20/21  2215 08/21/21  0616   *   < > 143 145* 143   K 4.2   < > 4.1 4.3 4.6   *   < > 111* 113* 112*   CO2 20   < > 21 18* 19*   BUN 20   < > 20 19 19   CREATININE 0.96   < > 0.82 0.89 0.90   GLUCOSE 119*   < > 114* 120* 114*   PHOS 4.4  --  4.1 3.9  --     < > = values in this interval not displayed. Liver Function Test:   Recent Labs     08/19/21  1451   PROT 5.1*   LABALBU 3.0*   ALT 44*   *   ALKPHOS 69   BILITOT 0.41     Amylase/Lipase:  No results for input(s): AMYLASE, LIPASE in the last 72 hours. Coagulation Profile:   Recent Labs     08/19/21  1451   INR 1.1   PROTIME 11.7   APTT 26.1     Cardiac Enzymes:  No results for input(s): CKTOTAL, CKMB, CKMBINDEX, TROPONINI in the last 72 hours.   Lactic Acid:  Lab Results   Component Value Date LACTA NOT REPORTED 08/20/2021    LACTA NOT REPORTED 08/20/2021    LACTA NOT REPORTED 08/20/2021     BNP:   No results found for: BNP  D-Dimer:  No results found for: DDIMER  Others:   No results found for: TSH, C0IAYZK, C6LNGNB, THYROIDAB, FT3, T4FREE  No results found for: KAREN, RHEUMFACTOR, SEDRATE, CRP  No results found for: Willim Pillion  No results found for: IRON, TIBC, FERRITIN  No results found for: SPEP, UPEP  No results found for: PSA, CEA, , MQ5293,     Microbiology:  No results for input(s): SPECDESC, SPECDESC, SPECIAL, CULTURE, CULTURE, STATUS, ORG, CDIFFTOXPCR, CAMPYLOBPCR, SALMONELLAPC, SHIGAPCR, SHIGELLAPCR, MPNEUG, MPNEUM, LACTOQL in the last 72 hours. Pathology:    Radiology Reports:  XR CHEST PORTABLE   Preliminary Result   Worsening aeration right lung base questioning developing pneumonia and/or   atelectasis with a persistent right suprahilar opacity. Subcutaneous   emphysema is present. CT scan of the chest would prove helpful for complete   assessment. XR CHEST PORTABLE   Final Result   Stable support lines      Asymmetric prominence of the right hilum with suggestion of spiculated right   upper lung mass. Malignancy is a differential consideration. Correlative CT   chest suggested      New mild diffuse asymmetric right lung interstitial infiltrate related to   asymmetric edema and/or inflammation         XR CHEST PORTABLE   Final Result   1. Tube positioning as discussed   2. No acute abnormality   3. Spiculated lesion in the right suprahilar region which may represent   scarring or mass. Recommend CT chest when clinically feasible         XR CHEST PORTABLE    (Results Pending)   MRI BRAIN WO CONTRAST    (Results Pending)   MRI CERVICAL SPINE WO CONTRAST    (Results Pending)       Echocardiogram:   No results found for this or any previous visit. Cardiac Catheterization:   No results found for this or any previous visit.       ASSESSMENT AND PLAN Assessment:    // Acute hypoxic respiratory failure, requiring invasive mechanical ventilation  // Resuscitated V. fib cardiac arrest  // Acute ST elevation inferior MI  // S/p PCI with KHURRAM to mid RCA  // Suspected anoxic encephalopathy  // Lactic acidosis secondary cardiac arrest, improved  // Acute metabolic and respiratory acidosis, improved but still present  // Leukocytosis secondary to cardiac arrest and MI, resolved  // Hyperglycemia better  // Bilateral hilar prominence possible hilar mass  // Possible COPD        Plan:    I personally interviewed/examined the patient; reviewed interval history, interpreted all available radiographic and laboratory data at the time of service. Patient was on hypothermic protocol but has been rewarmed  Patient currently sedated with fentanyl and propofol drip. Patient has come off of the pressors  Continue with current ventilator support he is currently on 50% oxygen  Ventilator rate has been adjusted to help with respiratory acidosis  Chest x-ray did not show definite pneumothorax but mild subcutaneous emphysema present likely secondary to CPR and has right basal infiltrate  Continue antiplatelet, statins, as per cardiology  Continue lung protective mechanical ventilation, appropriate changes made in ventilator settings  Continue to monitor I/O with a goal of even fluid balance  Continue to monitor CBC, coagulation profile, and BMP  DVT prophylaxis per CT surgery  Antimicrobials reviewed; no need for antibiotic at this time from pulmonary standpoint  Glycemic control is adequate    Discussed with nursing staff treatment plan discussed with  Discussed with respiratory therapist.     The patient is/remains critically ill with illness/injury that acutely impairs one or more vital organ systems, such that there is a high probability of imminent or life threatening deterioration in the patient's condition.  Critical care time of greater than 35 minutes was spent (excluding procedures), in coordination of care during bedside rounds and discussion of patient care in detail, and recommendations of the team were adopted in the plan. Necessity of all invasive devices was also confirmed. Taj Devi MD.   Pulmonary and Critical Care Medicine           8/21/2021, 9:50 AM    Please note that this chart was generated using voice recognition Dragon dictation software. Although every effort was made to ensure the accuracy of this automated transcription, some errors in transcription may have occurred.

## 2021-08-21 NOTE — PROGRESS NOTES
Dr. Telma Butler at bedside updated on patient status, vent settings and labs.  No new orders    Connie Monzon RN

## 2021-08-22 NOTE — PROCEDURES
LONG-TERM EEG-VIDEO [id number]    Patient: Sheila Merino  Age: 62 y.o. MRN: [id number]    Referring Physician: No ref. provider found  History: The patient is a 62 y.o. male who presented breakthrough seizure/encephalopathy. This long-term video-EEG monitoring study was performed to determine the nature of the patient's clinical events. The patient is on neuroactive medications.    Sheila Merino   Current Facility-Administered Medications   Medication Dose Route Frequency Provider Last Rate Last Admin    magnesium sulfate 1000 mg in dextrose 5% 100 mL IVPB  1,000 mg Intravenous PRN Charly Gutiérrez MD        potassium chloride 20 mEq/50 mL IVPB (Central Line)  20 mEq Intravenous PRN Charly Gutiérrez MD        potassium chloride (KLOR-CON M) extended release tablet 40 mEq  40 mEq Oral PRN Charly Gutiérrez MD        Or    potassium bicarb-citric acid (EFFER-K) effervescent tablet 40 mEq  40 mEq Oral PRN Charly Gutiérrez MD        aspirin chewable tablet 324 mg  324 mg Per G Tube Once Alexandra Leavitt MD        norepinephrine (LEVOPHED) 16 mg in sodium chloride 0.9 % 250 mL infusion  2-100 mcg/min Intravenous Continuous Alexandra Leavitt MD   Stopped at 08/20/21 1308    midazolam (VERSED) 1 mg/mL in D5W infusion  1-10 mg/hr Intravenous Continuous Charly Gutiérrez MD   Stopped at 08/19/21 1715    fentaNYL 20 mcg/mL Infusion  12.5-200 mcg/hr Intravenous Continuous Charly Gutiérrez MD 5 mL/hr at 08/22/21 0440 100 mcg/hr at 08/22/21 0440    sodium chloride flush 0.9 % injection 5-40 mL  5-40 mL Intravenous 2 times per day Charly Gutiérrez MD   10 mL at 08/21/21 2043    sodium chloride flush 0.9 % injection 5-40 mL  5-40 mL Intravenous PRN Charly Gutiérrez MD        0.9 % sodium chloride infusion  25 mL Intravenous PRN Charly Gutiérrez MD        acetaminophen (TYLENOL) tablet 650 mg  650 mg Oral Q4H PRN Charly Gutiérrez MD        atorvastatin (LIPITOR) tablet 80 mg  80 mg Oral Nightly Marybeth Rodriguez MD   80 mg at 08/21/21 2043    ticagrelor (BRILINTA) tablet 90 mg  90 mg Oral BID Marybeth Rodriguez MD   90 mg at 08/21/21 2043    aspirin EC tablet 81 mg  81 mg Oral Daily Marybeth Rodriguez MD   81 mg at 08/21/21 0854    ondansetron (ZOFRAN-ODT) disintegrating tablet 4 mg  4 mg Oral Q8H PRN Marybeth Rodriguez MD        Or    ondansetron [facility]) injection 4 mg  4 mg Intravenous Q6H PRN Marybeth Rodriguez MD        cisatracurium besylate (NIMBEX) 200 mg in sodium chloride 0.9 % 100 mL infusion  0.5-10 mcg/kg/min Intravenous Continuous Marybeth Rodriguez MD   Stopped at 08/21/21 0908    propofol injection  5-50 mcg/kg/min Intravenous Titrated Marybeth Rodriguez MD 7.1 mL/hr at 08/22/21 0612 15 mcg/kg/min at 08/22/21 2874     Technical Description: This is a 21-channel digital EEG recording with time-locked video. Electrodes were placed in accordance with the 10-20 International System of Electrode Placement. Single lead EKG monitoring was included. Baseline EEG Recording:  A formal baseline EEG recording was not obtained. Day 1 - 8/20/21, starting at 2017    Interictal EEG Samples: The background activity consisted of 4 to 5 Hz of polymorphic delta theta activity of 15 to 20 µV. During the first half of the recording, there were occasional periods of diffuse attenuation lasting for 1 to 2 seconds. The background was symmetric. As recording progressed, the background appeared more continuous. The background showed reactivity with stimulation. The EKG channel revealed no abnormalities. Ictal EEG Recording / Patient Events: During this period the patient had no events or seizures. Summary: During this day of recording no events were recorded. The interictal EEG was abnormal due to diffuse polymorphic delta and theta activity suggesting moderate to severe encephalopathy gradually improved as recording progressed.  Monitoring was continued in order to record the patient's typical events. The EKG channel revealed no abnormalities. Day 2 - 8/21/21    Interictal EEG Samples: On today's recording, the background appeared continuous and previously seen attenuation was not seen. Ictal EEG Recording / Patient Events: During this period the patient had no events or seizures. Summary: During this day of recording no events were recorded. The interictal EEG was abnormal due to diffuse polymorphic delta and theta activity suggesting moderate to severe encephalopathy, slightly improved from yesterday. The presence of state change and reactivity are good prognostic factors. Monitoring was continued in order to record the patients typical events. The EKG channel revealed no abnormalities. Day 3 - 8/22/21 reviewed through end of the recording at 6:30 AM    Interictal EEG Samples: Interictal EEG was unchanged from yesterday. Stimulus induced rhythmic ictal discharges were seen. Ictal EEG Recording / Patient Events: During this period the patient had no events or seizures. Summary: During this day of recording no events were recorded. The interictal EEG was abnormal due to diffuse polymorphic delta and theta slowing suggesting severe encephalopathy. No abnormal epileptiform activity was seen. Monitoring was discontinued. The EKG channel revealed no abnormalities. Summary and behavior: The interictal EEG was abnormal.  The background rhythm showed diffuse polymorphic delta and theta activity. Conclusion: This was an abnormal 3 days video EEG recording suggesting severe encephalopathy. No abnormal epileptiform activity was seen.     Sheree Camargo MD  Diplomate, American Board of Psychiatry and Neurology  Diplomate, American Board of Clinical Neurophysiology  Diplomate, American Board of Epilepsy

## 2021-08-22 NOTE — PROGRESS NOTES
Pt Heart rate over 200-charge ORLANDO Barillasyak-Sljskqq-Hqq at bedside-rvr to torsades -zqral-brab-8 rounds of compressions finished-afib rvr at 0924-multiple shocks delivered during code-multple shocks for cardioversion delivered for Afib RVR--Cardiologist speaking with family -see code sheet

## 2021-08-22 NOTE — PROGRESS NOTES
Discussion with the family. Patient continues to be critically ill requiring multiple pressors to maintain adequate MAPs. Questionable neurological status at this point given events from this morning. Neuro-critical care at bedside to evaluate patient. After discussion with the family the plan is to change code status to [facility] with no escalation of care. However, if the patient has recurrent decompensation will plan to focus on comfort care measures. Discussed with Dr. Summer Rosen.     Rossy Medina MD  Cardiology

## 2021-08-22 NOTE — CARE COORDINATION
Transitional planning. Pt experienced code blue today. Follow progresson and need. Goal is home but anticipate some kind of therapy.

## 2021-08-22 NOTE — PROGRESS NOTES
Nephrology Progress Note    Subjective:     Currently in a-fib with RVR, been in/out a-fib at night  U/O decreasing  Did  nicely after IV Lasix yesterday. Cardiology obtaining STAT EKG, getting ready administer lopressor    09:12 loss of pulse, Vfib, initially almost looked like torsades for a bit, declining blood pressure      Objective:  CURRENT TEMPERATURE:  Temp: 97.9 °F (36.6 °C)  MAXIMUM TEMPERATURE OVER 24HRS:  Temp (24hrs), Av.7 °F (36.5 °C), Min:97.3 °F (36.3 °C), Max:97.9 °F (36.6 °C)    CURRENT RESPIRATORY RATE:  Resp: 30  CURRENT PULSE:  Pulse: 114  CURRENT BLOOD PRESSURE:  BP: 108/67  24HR BLOOD PRESSURE RANGE:  Systolic (63CFX), QCO:602 , Min:91 , MXI:234   ; Diastolic (97LNZ), VQV:30, Min:67, Max:91    24HR INTAKE/OUTPUT:      Intake/Output Summary (Last 24 hours) at 2021 0810  Last data filed at 2021 0400  Gross per 24 hour   Intake 1500 ml   Output 1280 ml   Net 220 ml     Patient Vitals for the past 96 hrs (Last 3 readings):   Weight   21 0400 164 lb 14.5 oz (74.8 kg)   21 1441 175 lb (79.4 kg)     Physical Exam:  General appearance:sedated on vent  Skin: warm and dry, no rash or erythema  Eyes: conjunctivae normal and sclera anicteric  ENT: : ETT in place  Neck: trachea midline, neck supple  Pulmonary: no wheezing or rhonchi. No rales heard.   Cardiovascular: V-fib  Abdomen: soft, obese, ND hypoactive bs  Extremities: somewhat cool, no cyanosis, clubbing, no pitting  edema    Labs:   CBC:  Recent Labs     21  1451 21  0355 21  0616   WBC 21.3* 11.3 6.8   RBC 3.91* 3.84* 4.08*   HGB 11.6* 11.6* 12.1*   HCT 40.7 36.9* 40.3*   .1* 96.1 98.8   MCH 29.7 30.2 29.7   MCHC 28.5 31.4 30.0   RDW 14.7* 14.9* 15.1*    239 184   MPV 11.9 10.9 11.4      BMP:   Recent Labs     21  2215 21  0616 21  0554   * 143 148*   K 4.3 4.6 4.4   * 112* 116*   CO2 18* 19* 18*   BUN 19 19 26*   CREATININE 0.89 0.90 1.14   GLUCOSE 120* 114* 103*   CALCIUM 8.1* 8.1* 8.5*        Phosphorus:    Recent Labs     08/20/21  1005 08/20/21  1554 08/20/21  2215   PHOS 4.4 4.1 3.9     Magnesium:   Recent Labs     08/20/21  1005 08/20/21  1554 08/20/21  2215   MG 2.1 2.0 2.0     Albumin:   Recent Labs     08/19/21  1451   LABALBU 3.0*       IRON:  No results found for: IRON  Iron Saturation:  No components found for: PERCENTFE  TIBC:  No results found for: TIBC  FERRITIN:  No results found for: FERRITIN  SPEP:   Lab Results   Component Value Date    PROT 5.1 08/19/2021     UPEP: No results found for: TPU         Radiology:  Reviewed as available    Assessment:  1. JOHN initially near anuric this am, now non-oliguric with response to diuretics. Baseline Cr presumed normal.   JOHN secondary to ischemic ATN s/p V-fib arrest.   2. V fib arrest  3. STEMI PCI to RCA  4. HF EF 35%  5. HTN  6. HLD  7. Smoker        Plan:  1. I myself called code blue 9:13. Code team arriving, loss of pulse, CPR ACLS in progress  2. At [place] [id number] I was able to reach his wife via phone, she is aware of active code and only lives a few minutes away, she is on her way in. Following  Will discuss with Dr. [place] Roads    Please do not hesitate to call with questions. Attending Physician Statement  Patient is now on 3 pressors. Patient had a long cold. Patient has a long cold. There is a discussion between family is in progress at this point.   Will follow  Roxie Delgado MD          Electronically signed by KEVIN Oneill on 8/22/2021 at 8:10 AM

## 2021-08-22 NOTE — PLAN OF CARE
Problem: OXYGENATION/RESPIRATORY FUNCTION  Goal: Patient will maintain patent airway  8/22/2021 1519 by Lina Lamb RN  Outcome: Ongoing  8/22/2021 0733 by Saleem Sheehan RCP  Outcome: Ongoing  Goal: Patient will achieve/maintain normal respiratory rate/effort  8/22/2021 1519 by Lina Lamb RN  Outcome: Ongoing  8/22/2021 0733 by Saleem Sheehan RCP  Outcome: Ongoing     Problem: MECHANICAL VENTILATION  Goal: Patient will maintain patent airway  8/22/2021 1519 by Lina Lamb RN  Outcome: Ongoing  8/22/2021 0733 by Saleem Sheehan RCP  Outcome: Ongoing  Goal: Oral health is maintained or improved  8/22/2021 1519 by Lina Lamb RN  Outcome: Ongoing  8/22/2021 0733 by Saleem Sheehan RCP  Outcome: Ongoing  Goal: ET tube will be managed safely  8/22/2021 1519 by Lina Lamb RN  Outcome: Ongoing  8/22/2021 0733 by Saleem Sheehan RCP  Outcome: Ongoing  Goal: Ability to express needs and understand communication  8/22/2021 1519 by Lina Lamb RN  Outcome: Ongoing  8/22/2021 0733 by Saleem Sheehan RCP  Outcome: Ongoing  Goal: Mobility/activity is maintained at optimum level for patient  8/22/2021 1519 by Lina Lamb RN  Outcome: Ongoing  8/22/2021 0733 by Saleem Sheehan RCP  Outcome: Ongoing     Problem: SKIN INTEGRITY  Goal: Skin integrity is maintained or improved  8/22/2021 1519 by Lina Lamb RN  Outcome: Ongoing  8/22/2021 0733 by Saleem Sheehan RCP  Outcome: Ongoing     Problem: Skin Integrity:  Goal: Will show no infection signs and symptoms  8/22/2021 1519 by Lina Lamb RN  Outcome: Ongoing  8/22/2021 0733 by Saleem Sheehan RCP  Outcome: Ongoing  Goal: Absence of new skin breakdown  8/22/2021 1519 by Lina Lamb RN  Outcome: Ongoing  8/22/2021 0733 by Saleem Sheehan RCP  Outcome: Ongoing     Problem: Falls - Risk of:  Goal: Will remain free from falls  8/22/2021 1519 by Lina Lamb, RN  Outcome: Ongoing  8/22/2021 0733 by Saleem Sheehan RCP  Outcome: Ongoing     Problem: Falls - Risk of:  Goal: Will remain free from falls  8/22/2021 1519 by Lina Lamb RN  Outcome: Ongoing  8/22/2021 0733 by Saleem Sheehan RCP  Outcome: Ongoing  Goal: Absence of physical injury  8/22/2021 1519 by Lina Lamb RN  Outcome: Ongoing  8/22/2021 0733 by Saleem Sheehan RCP  Outcome: Ongoing     Problem: Bleeding:  Goal: Will show no signs and symptoms of excessive bleeding  8/22/2021 1519 by Lina Lamb RN  Outcome: Ongoing  8/22/2021 0733 by Saleem Sheehan RCP  Outcome: Ongoing     Problem: Nutrition  Goal: Optimal nutrition therapy  8/22/2021 1519 by Lina Lamb RN  Outcome: Ongoing  8/22/2021 0733 by Saleem Sheehan RCP  Outcome: Ongoing     Problem: Non-Violent Restraints  Goal: Removal from restraints as soon as assessed to be safe  8/22/2021 1519 by Lina Lamb RN  Outcome: Not Met This Shift  8/22/2021 0733 by Saleem Sheehan RCP  Outcome: Ongoing  Goal: No harm/injury to patient while restraints in use  8/22/2021 1519 by Lina Lamb RN  Outcome: Ongoing  8/22/2021 0733 by Saleem Sheehan RCP  Outcome: Ongoing  Goal: Patient's dignity will be maintained  8/22/2021 1519 by Lina Lamb RN  Outcome: Ongoing  8/22/2021 0733 by Saleem Sheehan RCP  Outcome: Ongoing

## 2021-08-22 NOTE — PROGRESS NOTES
[facility]   Progress Note                    Date:   8/22/2021  Patient name:  Manuel Haines  Date of admission:  8/19/2021  2:33 PM  MRN:   [id number]  YOB: 1960  PCP:    No primary care provider on file. Reason for Admission:  Card[facility] arrest ([facility] [I46.9]  STEMI (ST elevation myocardial infarction) ([facility] [I21.3]  ST elevation myocardial infarction (STEMI), unspecified artery ([facility] [I21.3]    Subjective:      Clinical Changes / Abnormalities:    Patient seen and examined at bedside. No acute events overnight. Per report patient moving upper extremities.      Urine output in the last 24 hours:     Intake/Output Summary (Last 24 hours) at 8/22/2021 0810  Last data filed at 8/22/2021 0400  Gross per 24 hour   Intake 1500 ml   Output 1280 ml   Net 220 ml     I/O since admission: +4 liters      Medications:   Scheduled Meds:   aspirin  324 mg Per G Tube Once    sodium chloride flush  5-40 mL Intravenous 2 times per day    atorvastatin  80 mg Oral Nightly    ticagrelor  90 mg Oral BID    aspirin  81 mg Oral Daily     Continuous Infusions:   norepinephrine Stopped (08/20/21 1308)    midazolam Stopped (08/19/21 1715)    fentaNYL 100 mcg/hr (08/22/21 0440)    sodium chloride      cisatracurium (NIMBEX) infusion Stopped (08/21/21 0908)    propofol 15 mcg/kg/min (08/22/21 0612)     CBC:   Recent Labs     08/19/21  1451 08/20/21  0355 08/21/21  0616   WBC 21.3* 11.3 6.8   HGB 11.6* 11.6* 12.1*    239 184     BMP:    Recent Labs     08/20/21  2215 08/21/21  0616 08/22/21  0554   * 143 148*   K 4.3 4.6 4.4   * 112* 116*   CO2 18* 19* 18*   BUN 19 19 26*   CREATININE 0.89 0.90 1.14   GLUCOSE 120* 114* 103*     Hepatic:   Recent Labs     08/19/21  1451   *   ALT 44*   BILITOT 0.41   ALKPHOS 69         Recent Labs     08/20/21  1554 08/20/21  2215 08/21/21  0416   TROPONINT NOT REPORTED NOT REPORTED NOT REPORTED     BNP:   Recent Labs     08/19/21  1458 PROBNP 697*     Lipids:   Recent Labs     08/20/21  0355   CHOL 167   HDL 40*     INR:   Recent Labs     08/19/21  1451   INR 1.1       Objective:   Vitals: /67   Pulse 114   Temp 97.9 °F (36.6 °C) (Esophageal)   Resp 30   Ht 6' 2\" (1.88 m)   Wt 164 lb 14.5 oz (74.8 kg)   SpO2 98%   BMI 21.17 kg/m²    Last Recorded Weight:  [unfilled]    Constitutional and General Appearance:    Intubated and sedated  Respiratory:  · No for increased work of breathing. · On auscultation: diminished breath sounds bilaterally  · ETT in place  Cardiovascular:  · The apical impulse is not displaced  · Heart tones are crisp and normal. Regular S1 and S2. No murmurs. Abdomen:   · No masses or tenderness  · Bowel sounds present  Extremities:  ·  No Cyanosis or Clubbing  ·  Lower extremity edema: No  ·  Skin: Warm and dry  Neurological:  · Intubated, sedated and paralyzed    Diagnostic Studies:     EKG: Inferior STEMI  ECHO:   pending    Cardiac Angiography:  LMCA: Normal 0% stenosis. LAD: Diffuse irregualrtities 40-50%.       Lesion on Mid LAD: 50% stenosis.       LCx: Diffuse irregularities 20-30%. RCA: Acute occlusion.         Lesion on Mid RCA: 100% stenosis 45 mm length reduced to 0%. Pre     procedure ITZEL 0 flow was noted. Post Procedure ITZEL III flow was     present. Poor runoff was present. The lesion was diagnosed as High Risk     (C).      Coronary Tree  Dominance: Right       LV Analysis LV function assessed as:Normal.   The LV gram was performed in the RASCON 30 position. LVEF: 50%.          Conclusions:   Acute occlusion of mid RCA.    Successful PCI / Drug Eluting Stent of the mid RCA with restoration of    ITZEL III flow.    Non-obstructive LAD and minimal LCX disease.    Borderline LV systolic function.        Recommendations        Routine Post MI/Stent Orders.    Hypothermia protocol.    Supportive care.    Medical therapy as needed.    Risk factor modification    Echo 8/20/21:  Left ventricle is normal in size. Global left ventricular systolic function  is moderately reduced. Calculated ejection fraction 35% by Guerrero's method. Visually estimated EF  30-35%. Akinetic basal inferoseptal, basal anteroseptal basal inferolateral, and  basal anterolateral segments. Dyskinetic basal inferior. Right ventricular function appears reduced. Aortic valve is mildly sclerotic but opens well. No aortic insufficiency. Assessment / Acute Cardiac Problems:   1. V fib arrest   2. Inferior STEMI s/p PCI to RCA 8/19/21  3. HFrEF 35%  4. HLD  5. HTN  6. Smoker    Plan of Treatment:   1. Patient rewarmed as of yesterday. Currently sedated but reported to be moving upper extremities. Neuro following. EEG in process. 2. Sinus tachycardia noted. Holding BB/ACEI due to low normal pressures. 3. Continue ASA & brilinta  4. Continue atorvastatin  5. K>4, Mg>2  6. Will consider NS infusion. Nephrology on board. Appreciate recs. Chelly Reyna MD, MD  Fellow, [address] [place] [address]        Attending Physician Statement  I have discussed the case of Pola Mantilla including pertinent history and exam findings with the resident. I have seen and examined the patient and the key elements of the encounter have been performed by me. I agree with the assessment, plan and orders as documented by the resident With changes made to the note.      Electronically signed by Delia Garcia MD on 8/22/2021 at 12:43 PM.    [facility]      [phone]

## 2021-08-22 NOTE — PLAN OF CARE
Problem: OXYGENATION/RESPIRATORY FUNCTION  Goal: Patient will maintain patent airway  8/21/2021 2240 by Luigi Soria RN  Outcome: Ongoing     Problem: OXYGENATION/RESPIRATORY FUNCTION  Goal: Patient will achieve/maintain normal respiratory rate/effort  Description: Respiratory rate and effort will be within normal limits for the patient  8/21/2021 1845 by Christiano Riggs RN  Outcome: Ongoing     Problem: MECHANICAL VENTILATION  Goal: Patient will maintain patent airway  8/21/2021 2240 by Luigi Soria RN  Outcome: Ongoing     Problem: MECHANICAL VENTILATION  Goal: Oral health is maintained or improved  8/21/2021 2240 by Luigi Soria RN  Outcome: Ongoing     Problem: MECHANICAL VENTILATION  Goal: ET tube will be managed safely  8/21/2021 2240 by Luigi Soria RN  Outcome: Ongoing     Problem: MECHANICAL VENTILATION  Goal: Ability to express needs and understand communication  8/21/2021 2240 by Luigi Soria RN  Outcome: Ongoing     Problem: MECHANICAL VENTILATION  Goal: Mobility/activity is maintained at optimum level for patient  8/21/2021 2240 by Luigi Soria RN  Outcome: Ongoing     Problem: SKIN INTEGRITY  Goal: Skin integrity is maintained or improved  8/21/2021 2240 by Luigi Soria RN  Outcome: Ongoing  8/21/2021 1845 by Christiano Riggs RN  Outcome: Ongoing     Problem: Skin Integrity:  Goal: Will show no infection signs and symptoms  Description: Will show no infection signs and symptoms  8/21/2021 2240 by Luigi Soria RN  Outcome: Ongoing     Problem: Skin Integrity:  Goal: Absence of new skin breakdown  Description: Absence of new skin breakdown  8/21/2021 2240 by Luigi Soria RN  Outcome: Ongoing     Problem: Falls - Risk of:  Goal: Will remain free from falls  Description: Will remain free from falls  8/21/2021 2240 by Luigi Soria RN  Outcome: Ongoing     Problem: Falls - Risk of:  Goal: Absence of physical injury  Description: Absence of physical injury  8/21/2021 2240 by Kobe Saleh RN  Outcome: Ongoing     Problem: Bleeding:  Goal: Will show no signs and symptoms of excessive bleeding  Description: Will show no signs and symptoms of excessive bleeding  8/21/2021 2240 by Kobe Saleh RN  Outcome: Ongoing     Problem: Nutrition  Goal: Optimal nutrition therapy  8/21/2021 2240 by Kobe Saleh RN  Outcome: Ongoing     Problem: Non-Violent Restraints  Goal: Removal from restraints as soon as assessed to be safe  8/21/2021 2240 by Kobe Saleh RN  Outcome: Ongoing     Problem: Non-Violent Restraints  Goal: No harm/injury to patient while restraints in use  8/21/2021 2240 by Kobe Saleh RN  Outcome: Ongoing     Problem: Non-Violent Restraints  Goal: Patient's dignity will be maintained  8/21/2021 2240 by Kobe Saleh RN  Outcome: Ongoing

## 2021-08-22 NOTE — PROGRESS NOTES
& intact  Suction Catheter Diameter: 14  Equipment Changed: HME  Vent Type: Servo i  Vent Mode: PRVC  Vt Ordered: 620 mL  Rate Set: 30 bmp  FiO2 : 100 %  SpO2: 100 %  SpO2/FiO2 ratio: 100  Sensitivity: 1  PEEP/CPAP: 8  I Time/ I Time %: 0.9 s  Humidification Source: HME    VITAL SIGNS:   LAST-  /78   Pulse 127   Temp 97.9 °F (36.6 °C) (Esophageal)   Resp (!) 31   Ht 6' 2\" (1.88 m)   Wt 164 lb 14.5 oz (74.8 kg)   SpO2 100%   BMI 21.17 kg/m²   8-24 HR RANGE-  TEMP Temp  Av.9 °F (36.6 °C)  Min: 97.9 °F (36.6 °C)  Max: 97.9 °F (37.5 °C)   BP Systolic (44XBL), CFN:851 , Min:91 , OTK:875      Diastolic (39YWL), EGB:01, Min:67, Max:91     PULSE Pulse  Av.8  Min: 99  Max: 134   RR Resp  Av.5  Min: 30  Max: 31   O2 SAT SpO2  Av %  Min: 88 %  Max: 100 %   OXYGEN DELIVERY No data recorded     SYSTEMIC EXAMINATION:   General appearance - Mechanically ventilated, chronically ill-appearing  Mental status - sedated on propofol and fentanyl drip  Eyes - pupils equal and sluggish reactive, sclera anicteric. No corneal reflex  Mouth -orally intubated  Neck - supple, no significant adenopathy  Chest -mild subcutaneous emphysema present in upper left chest wall. Breath sounds bilaterally were dimnished to auscultation at bases and distant. There were no wheezes, rhonchi or rales.   Patient has flail chest  Heart - normal rate, regular rhythm, normal S1, S2, no murmurs, rubs, clicks or gallops  Abdomen - soft, nontender, nondistended, no masses or organomegaly  Neurological -sedated and does not follow any commands  Extremities -no pedal edema, no clubbing or cyanosis  Skin - normal coloration and turgor, no rashes, no suspicious skin lesions noted     DATA REVIEW     Medications: Current Inpatient  Scheduled Meds:   metoprolol        digoxin  250 mcg Intravenous Once    amiodarone bolus  150 mg Intravenous Once    aspirin  324 mg Per G Tube Once    sodium chloride flush  5-40 mL Intravenous 2 times per day    atorvastatin  80 mg Oral Nightly    ticagrelor  90 mg Oral BID    aspirin  81 mg Oral Daily     Continuous Infusions:   amiodarone 450mg/250ml D5W infusion 1 mg/min (08/22/21 0931)    vasopressin (Septic Shock) infusion 0.04 Units/min (08/22/21 0957)    phenylephrine (JESÚS-SYNEPHRINE) 50mg/250mL infusion 150 mcg/min (08/22/21 1158)    norepinephrine 30 mcg/min (08/22/21 1010)    midazolam Stopped (08/19/21 1715)    fentaNYL 100 mcg/hr (08/22/21 0440)    sodium chloride      cisatracurium (NIMBEX) infusion Stopped (08/21/21 0908)    propofol 15 mcg/kg/min (08/22/21 0612)     INPUT/OUTPUT:  In: 1500 [I.V.:1500]  Out: 700 [Urine:700]  Date 08/22/21 0000 - 08/22/21 2359   Shift [id number] 6729-7812 24 Hour Total   INTAKE   Shift Total(mL/kg)       OUTPUT   Urine(mL/kg/hr) 240(0.4) 80  320   Shift Total(mL/kg) 240(3.2) 80(1.1)  320(4.3)   Weight (kg) 74.8 74.8 74.8 74.8       LABS:-  ABG:   Recent Labs     08/21/21  1003 08/21/21  1556 08/22/21  0347 08/22/21  0932 08/22/21  1144   POCPH 7.293* 7.323* 7.323* 7.174* 7.241*   POCPCO2 46.7 43.5 42.4 64.3* 47.4   POCPO2 87.7 74.0* 72.0* 121.2* 245.1*   POCHCO3 22.6 22.5 22.0 23.7 20.3*   JENN3FXN 96 93* 93* 97 100*     CBC:   Recent Labs     08/19/21  1451 08/19/21  1451 08/20/21  0355 08/21/21  0616 08/22/21  1055   WBC 21.3*   < > 11.3 6.8 3.7   HGB 11.6*   < > 11.6* 12.1* 9.9*   HCT 40.7   < > 36.9* 40.3* 32.9*   .1*   < > 96.1 98.8 101.2      < > 239 184 140   LYMPHOPCT 35  --   --   --   --    RBC 3.91*   < > 3.84* 4.08* 3.25*   MCH 29.7   < > 30.2 29.7 30.5   MCHC 28.5   < > 31.4 30.0 30.1   RDW 14.7*   < > 14.9* 15.1* 15.6*    < > = values in this interval not displayed.      BMP:   Recent Labs     08/20/21  1005 08/20/21  1005 08/20/21  1554 08/20/21  1554 08/20/21  2215 08/20/21  2215 08/21/21  0616 08/21/21  0616 08/22/21  0554 08/22/21  0932 08/22/21  1055   *   < > 143   < > 145*   < > 143  --  148*  --  144 K 4.2   < > 4.1   < > 4.3   < > 4.6  --  4.4  --  4.2   *   < > 111*   < > 113*   < > 112*  --  116*  --  114*   CO2 20   < > 21   < > 18*   < > 19*  --  18*  --  17*   BUN 20   < > 20   < > 19   < > 19  --  26*  --  27*   CREATININE 0.96   < > 0.82   < > 0.89   < > 0.90   < > 1.14 1.52* 1.25*   GLUCOSE 119*   < > 114*   < > 120*   < > 114*  --  103*  --  126*   PHOS 4.4  --  4.1  --  3.9  --   --   --   --   --   --     < > = values in this interval not displayed. Liver Function Test:   Recent Labs     08/19/21  1451   PROT 5.1*   LABALBU 3.0*   ALT 44*   *   ALKPHOS 69   BILITOT 0.41     Amylase/Lipase:  No results for input(s): AMYLASE, LIPASE in the last 72 hours. Coagulation Profile:   Recent Labs     08/19/21  1451   INR 1.1   PROTIME 11.7   APTT 26.1     Cardiac Enzymes:  No results for input(s): CKTOTAL, CKMB, CKMBINDEX, TROPONINI in the last 72 hours. Lactic Acid:  Lab Results   Component Value Date    LACTA NOT REPORTED 08/20/2021    LACTA NOT REPORTED 08/20/2021    LACTA NOT REPORTED 08/20/2021     BNP:   No results found for: BNP  D-Dimer:  No results found for: DDIMER  Others:   No results found for: TSH, V8RKBZH, I5RIDQT, THYROIDAB, FT3, T4FREE  No results found for: KAREN, RHEUMFACTOR, SEDRATE, CRP  No results found for: Ginette Mackintosh  No results found for: IRON, TIBC, FERRITIN  No results found for: SPEP, UPEP  No results found for: PSA, CEA, , EA3014,     Microbiology:  No results for input(s): SPECDESC, SPECDESC, SPECIAL, CULTURE, CULTURE, STATUS, ORG, CDIFFTOXPCR, CAMPYLOBPCR, SALMONELLAPC, SHIGAPCR, SHIGELLAPCR, MPNEUG, MPNEUM, LACTOQL in the last 72 hours. Pathology:    Radiology Reports:  XR CHEST PORTABLE   Final Result   1. Support tubes remain in appropriate positions. 2. No significant change in bilateral airspace disease. No evidence for   pneumothorax.          XR CHEST PORTABLE   Final Result   Worsening bilateral pulmonary opacities consistent favoring worsening   multifocal pneumonitis. Pulmonary edema may have a similar appearance. Tubes and lines as above. US RENAL COMPLETE   Final Result   Slightly limited but essentially unremarkable renal ultrasound. No   hydronephrosis. Incidental small 1.1 cm hyperechoic liver lesion, nonspecific but favored to   represent a small hemangioma. MRI could be obtained if clinically warranted. XR CHEST PORTABLE   Final Result   Worsening aeration right lung base questioning developing pneumonia and/or   atelectasis with a persistent right suprahilar opacity. Subcutaneous   emphysema is present. CT scan of the chest would prove helpful for complete   assessment. XR CHEST PORTABLE   Final Result   Stable support lines      Asymmetric prominence of the right hilum with suggestion of spiculated right   upper lung mass. Malignancy is a differential consideration. Correlative CT   chest suggested      New mild diffuse asymmetric right lung interstitial infiltrate related to   asymmetric edema and/or inflammation         XR CHEST PORTABLE   Final Result   1. Tube positioning as discussed   2. No acute abnormality   3. Spiculated lesion in the right suprahilar region which may represent   scarring or mass. Recommend CT chest when clinically feasible         MRI BRAIN WO CONTRAST    (Results Pending)   MRI CERVICAL SPINE WO CONTRAST    (Results Pending)   XR CHEST PORTABLE    (Results Pending)       Echocardiogram:   No results found for this or any previous visit. Cardiac Catheterization:   No results found for this or any previous visit.       ASSESSMENT AND PLAN     Assessment:    // Acute hypoxic respiratory failure, requiring invasive mechanical ventilation  // Resuscitated V. fib cardiac arrest  // Acute ST elevation inferior MI  // S/p PCI with KHURRAM to mid RCA  // Suspected anoxic encephalopathy  // Lactic acidosis secondary cardiac arrest, improved  // Acute metabolic and respiratory acidosis, improved but still present  // Leukocytosis secondary to cardiac arrest and MI, resolved  // Hyperglycemia better  // Bilateral hilar prominence possible hilar mass  // Possible COPD  //Acute kidney injury  //Hypernatremia  //Anemia, normochromic normocytic        Plan:    I personally interviewed/examined the patient; reviewed interval history, interpreted all available radiographic and laboratory data at the time of service. Chest x-ray remains unchanged  Patient currently sedated with fentanyl and propofol drip. Patient required multiple pressors  Cardiology following patient  Continue with current ventilator support  He is currently on 100 % oxygen  Ventilator rate has been adjusted to help with respiratory acidosis  Chest x-ray did not show definite pneumothorax but mild subcutaneous emphysema present likely secondary to CPR and has right basal infiltrate  Continue antiplatelet, statins, as per cardiology  Continue lung protective mechanical ventilation, appropriate changes made in ventilator settings  Continue to monitor I/O with a goal of even fluid balance  Continue to monitor CBC, coagulation profile, and BMP  DVT prophylaxis per CT surgery  Antimicrobials reviewed; no need for antibiotic at this time from pulmonary standpoint  Glycemic control is adequate  Discussed with nursing staff treatment plan discussed with  Discussed with respiratory therapist.     The patient is/remains critically ill with illness/injury that acutely impairs one or more vital organ systems, such that there is a high probability of imminent or life threatening deterioration in the patient's condition. Critical care time of greater than 35 minutes was spent (excluding procedures), in coordination of care during bedside rounds and discussion of patient care in detail, and recommendations of the team were adopted in the plan. Necessity of all invasive devices was also confirmed.      Erling Heimlich, MD. Pulmonary and Critical Care Medicine           8/22/2021, 12:50 PM    Please note that this chart was generated using voice recognition Dragon dictation software. Although every effort was made to ensure the accuracy of this automated transcription, some errors in transcription may have occurred.

## 2021-08-22 NOTE — PROGRESS NOTES
Patient went into afib with rvr this morning with NSVT eventually degenerating into sustained VT. Code blue initiated and patient defibrillated x 2. Patient continued to remain in afib with rvr. He received 300 mg amio bolus, 100 mg of lidocaine, digoxin 500 mcg, and was started on amio gtt, lido gtt, levophed, vasopressin, and phenylephrine. For his symptomatic afib, we attempted cardioversion multiple times with 360J energy, however, they were all unsuccessful. Patient given Ibutilide 1 mg over 10 minutes as well. Family was updated throughout the course of these events. Cardioversion was attempted after Ibutilide as well which was unsuccessful. Stat echo did not reveal signs of significant effusion or valve pathology. EKG unremarkable for acute MI as well. Spoke to Dr. Nereida Nichole (EP) and he recommends another bolus of amiodarone and continue the gtt at 1. He feels we have tried all available interventions at this point. Will discuss goals of care with family.      Zeeshan Mina MD  Cardiology Fellow

## 2021-08-22 NOTE — PROGRESS NOTES
Daily Progress Note  Neuro Critical Care    Patient Name: Harjinder Hu  Patient : 1960  Room/Bed: 1028/1028-01  Code Status: DNR-CCA  Allergies: Not on File    CHIEF COMPLAINT:      Cardiac arrest     INTERVAL HISTORY    Initial Presentation (Admitted 21): The patient is a 62 y.o. male with no known medical history who presents with witnessed Vfib arrest. Per records, patient was mowing the lawn, bent down to clean out lawn clippings when he clutched his chest and collapsed. Upon EMS arrival, patient was in Vfib arrest. He was in and out of arrest for 30 minutes in the field.  EKG by EMS showing inferior STEMI.  Cath Lab activated at that time prior to arrival.  On arrival, the patient was in bradycardic PEA with active CPR in process. [address] was obtained after 2 rounds of CPR and epinephrine.     He was taken to cath lab emergently. Acute occlusion of mid RCA s/p successful PCI with non-obstructive LAD and minimal LCX disease. EF 50%.      Neuro critical care consulted for post arrest prognostication. Hypothermia protocol initiated. On exam, patient was on Versed 10 mg/hr and Fentanyl 25 mcg/hr. Spontaneous movement noted to LUE. Pupils equal and reactive. Weak cough. Localizes bilateral upper extremities, L>R. No movement to bilateral lower extremities. No commands. Hospital Course:   : Rewarmed around 0900AM, Nimbex infusion stopped. On exam off sedation patient woke up and followed commands in the bilateral upper extremities. No movement in the bilateral lower extremities to pain. MRI c-pine added for tomorrow. Last 24h:   No acute events overnight. Nursing reports patient followed commands off sedation yesterday evening. Remains intubated and on Propofol 15mcg/kg/min and Fentanyl 100mcg/hr this morning. Sedation held briefly for examine. Patient opened eyes but did not follow commands. PERRL. He became tachypneic and tachycardic so sedation was resumed.   Nursing reports patient typically takes time to wake up and follow commands off sedation. Planned to come back later on rounds for repeat examination off sedation. Later this morning, patient had an episode of vtach/vfib arrest.  ROSC achieved after 5 rounds of CPR with multiple shocks delivered. Went into afib with RVR with severe hypotension (MAP 40's), cardioversion attempted multiple times, given Amiodarone, Lidocaine and started on Levophed, Vasopressin and Neosynephrine infusions. Re-evaluated later this afternoon post arrest.  Remained on Vasopression, Levophed and Neosynephrine with SBP 80's. Code status is now DNR-CCA. On Propofol 10mcg/kg/min which was stopped prior to exam.  PERRL. Does not open eyes or follow commands. No spontaneous movement. Stimulation minimized due to hemodynamic instability (tachycardic&hypotensive). Remains on LTME.      CURRENT MEDICATIONS:  SCHEDULED MEDICATIONS:   aspirin  324 mg Per G Tube Once    sodium chloride flush  5-40 mL Intravenous 2 times per day    atorvastatin  80 mg Oral Nightly    ticagrelor  90 mg Oral BID    aspirin  81 mg Oral Daily     CONTINUOUS INFUSIONS:   norepinephrine Stopped (21 1308)    midazolam Stopped (21 1715)    fentaNYL 100 mcg/hr (21 0440)    sodium chloride      cisatracurium (NIMBEX) infusion Stopped (21 0908)    propofol 15 mcg/kg/min (21 0612)     PRN MEDICATIONS:   magnesium sulfate, potassium chloride, potassium chloride **OR** potassium alternative oral replacement, sodium chloride flush, sodium chloride, acetaminophen, ondansetron **OR** ondansetron    VITALS:  Temperature Range: Temp: 97.9 °F (36.6 °C) Temp  Av.6 °F (36.4 °C)  Min: 97.3 °F (36.3 °C)  Max: 97.9 °F (36.6 °C)  BP Range: Systolic (22BNA), BZJ:235 , Min:91 , HYA:365     Diastolic (63RXZ), EYL:68, Min:67, Max:92    Pulse Range: Pulse  Av.4  Min: 94  Max: 114  Respiration Range: Resp  Av  Min: 23  Max: 31  Current Pulse Ox: SpO2: 97 %  24HR Pulse Ox Range: SpO2  Av.7 %  Min: 96 %  Max: 100 %  Patient Vitals for the past 12 hrs:   BP Pulse SpO2   21 0700 115/78 108 97 %   21 0600 (!) 108/91 106 --   21 0500 94/70 105 97 %   21 0300 91/68 107 96 %   21 0200 102/67 106 98 %   21 0100 95/67 106 97 %   21 0000 95/69 104 99 %   21 2300 -- 103 97 %   21 2200 93/69 105 99 %   21 2100 103/68 102 100 %   21 2000 104/84 114 100 %     Estimated body mass index is 21.17 kg/m² as calculated from the following:    Height as of this encounter: 6' 2\" (1.88 m).     Weight as of this encounter: 164 lb 14.5 oz (74.8 kg).  []<16 Severe malnutrition  []16-16.99 Moderate malnutrition  []17-18.49 Mild malnutrition  [x]18.5-24.9 Normal  []25-29.9 Overweight (not obese)  []30-34.9 Obese class 1 (Low Risk)  []35-39.9 Obese class 2 (Moderate Risk)  []?40 Obese class 3 (High Risk)    RECENT LABS:   Lab Results   Component Value Date    WBC 6.8 2021    HGB 12.1 (L) 2021    HCT 40.3 (L) 2021     2021    CHOL 167 2021    TRIG 119 2021    HDL 40 (L) 2021    ALT 44 (H) 2021     (H) 2021     2021    K 4.6 2021     (H) 2021    CREATININE 0.90 2021    BUN 19 2021    CO2 19 (L) 2021    INR 1.1 2021     24 HOUR INTAKE/OUTPUT:    Intake/Output Summary (Last 24 hours) at 2021 0717  Last data filed at 2021 0400  Gross per 24 hour   Intake 1500 ml   Output 1280 ml   Net 220 ml       IMAGING:   ECHO Complete 2D W Doppler W Color  Result Date: 2021  Transthoracic Echocardiography Report (TTE)  Patient Name Anton Barraza     Date of Study               2021               FABI   Date of      1960  Gender                      Male  Birth   Age          62 year(s)  Race                           Room Number  1028        Height:                     74 inch, 187.96 cm Corporate ID [id number]    Weight:                     175 pounds, 79.4 kg  #   Patient Acct [de-identified]   BSA:          2.05 m^2      BMI:     22.47 kg/m^2  #   MR #         [id number]     Lul Tran   Accession #  [id number]  Interpreting Physician      [address] [place] [address]   Fellow                   Referring Nurse                           Practitioner   Interpreting             Referring Physician         Kateri Landau  Fellow  Additional Comments Technically difficult study. Type of Study   TTE procedure:2D Echocardiogram, M-Mode, Doppler, Color Doppler. Procedure Date Date: 08/20/2021 Start: 10:47 AM Study Location: [facility] Technical Quality: Adequate visualization Indications:STEMI. History / Tech. Comments: Echo done at patient bedside. Patient Status: Inpatient Height: 74 inches Weight: 175 pounds BSA: 2.05 m^2 BMI: 22.47 kg/m^2 CONCLUSIONS Summary Left ventricle is normal in size. Global left ventricular systolic function is moderately reduced. Calculated ejection fraction 35% by Guerrero's method. Visually estimated EF 30-35%. Akinetic basal inferoseptal, basal anteroseptal basal inferolateral, and basal anterolateral segments. Dyskinetic basal inferior. Right ventricular function appears reduced. Aortic valve is mildly sclerotic but opens well. No aortic insufficiency.     Echocardiogram Limited 2D Adult  Result Date: 8/22/2021  Transthoracic Echocardiography Report (TTE)  Patient Name Antonia Barr     Date of Study               08/22/2021               FABI   Date of      1960  Gender                      Male  Birth   Age          62 year(s)  Race                           Room Number  1028        Height:                     74 inch, 187.96 cm   Corporate ID [id number]    Weight:                     175 pounds, 79.4 kg  #   Patient Acct [de-identified]   BSA:          2.05 m^2      BMI:      22.47  #                                                              kg/m^2 MR #         [id number]     Sonographer                 Peg Anthony   Accession #  [id number]  Interpreting Physician      [address] [place] [address] Street   Fellow                   Referring Nurse                           Practitioner   Interpreting             Referring Physician         [facility] MD FARIBA  Fellow  Type of Study   TTE procedure:2D Echocardiogram, Color Doppler, Limited Echo. Procedure Date Date: 08/22/2021 Start: 10:41 AM Study Location: [facility] Technical Quality: Adequate visualization Indications:LV Function. History / Tech. Comments: Echo done at patient bedside. STEMI Patient Status: STAT Height: 74 inches Weight: 175 pounds BSA: 2.05 m^2 BMI: 22.47 kg/m^2 CONCLUSIONS Summary Left ventricle is normal in size. Global left ventricular systolic function is normal. Calculated ejection fraction 53% by Guerrero's method. Abnormal septal motion; may be due to conduction abnormality. Infero-basal akinesis. Right ventricular function appears reduced. No significant valvular regurgitation or stenosis seen. No pericardial effusion seen. US RENAL COMPLETE  Result Date: 8/21/2021  Slightly limited but essentially unremarkable renal ultrasound. No hydronephrosis. Incidental small 1.1 cm hyperechoic liver lesion, nonspecific but favored to represent a small hemangioma. MRI could be obtained if clinically warranted. XR CHEST PORTABLE  Result Date: 8/22/2021  1. Support tubes remain in appropriate positions. 2. No significant change in bilateral airspace disease. No evidence for pneumothorax. XR CHEST PORTABLE  Result Date: 8/22/2021  Worsening bilateral pulmonary opacities consistent favoring worsening multifocal pneumonitis. Pulmonary edema may have a similar appearance. Tubes and lines as above. XR CHEST PORTABLE  Result Date: 8/21/2021  Worsening aeration right lung base questioning developing pneumonia and/or atelectasis with a persistent right suprahilar opacity.   Subcutaneous emphysema is present. CT scan of the chest would prove helpful for complete assessment. XR CHEST PORTABLE  Result Date: 8/20/2021  Stable support lines Asymmetric prominence of the right hilum with suggestion of spiculated right upper lung mass. Malignancy is a differential consideration. Correlative CT chest suggested New mild diffuse asymmetric right lung interstitial infiltrate related to asymmetric edema and/or inflammation     XR CHEST PORTABLE  Result Date: 8/19/2021  1. Tube positioning as discussed 2. No acute abnormality 3. Spiculated lesion in the right suprahilar region which may represent scarring or mass. Recommend CT chest when clinically feasible       Labs and Images reviewed with:  [] Dr. Kacy Irwin. Steve    [x] Dr. Drake Aguilar  [] Dr. Abimael Lovell  [] There are no new interval images to review. PHYSICAL EXAM     Initial exam in AM  CONSTITUTIONAL:  Intubated, sedation held for ~10 mins. Opens eyes to voice. Does not track or follow commands. HEAD:  normocephalic, atraumatic    EYES:  PERRL   ENT:  moist mucous membranes   NECK:  supple, symmetric   NEUROLOGIC:  Mental Status:  Intubated, sedation held. Opens eyes to voice. Does not track or follow commands. Cranial Nerves:    III: Pupils:  equal, round, reactive to light  III,IV,VI: Extra Ocular Movements: abnormal - not tracking, oculocephalics deferred due to c-collar  V: Corneal reflex:  completed. intact  Intact cough/gag    Motor Exam:    Not following commands. No movement to bilateral upper or lower extremities to pain. DRAINS:  [x] There are no drains for Neuro Critical Care to monitor at this time. ASSESSMENT AND PLAN:       63 yo male with unknown medical history who presents post witnessed vfib arrest. Estimated downtime ~ 30 minutes. Found to have an inferior STEMI taken to Cath lab for 100% acute mid RCA occlusion treated with PCI.  Underwent targeted temperature management, rewarmed on 8/21 around 0900AM.  Initially post rewarming, patient was opening eyes and following commands in BUE but not moving BLE. MRI Brain and Cervical spine pending. Unfortunately, patient suffered another vtach/vib arrest this morning, ROSC achieved after 5 rounds of CPR with multiple shocks. Went into afib with RVR with associated hypotension, requiring multiple vasopressors.       Vfib cardiac arrest in the setting of acute mid RCA occlusion treated successfully with PCI  STEMI  Recurrent vfib arrest 8/22, ROSC achieved after 5 rounds CPR with multiple shocks  Afib with RVR, failed multiple attempts at cardioversion  Severe hypotension, possibly secondary to cardiogenic shock  On Aspirin and Brilinta  Requiring Vasopressin, Levophed and Neosynephrine for BP support  On Amiodarone infusion  DNR-CCA  Cardiology following     Acute respiratory failure requiring mechanical ventilation  Pulmonology following     Encephalopathy, likely multifactorial  Concern for potential anoxic brain injury in setting of recurrent cardiac arrest  Follow clinical examination post re-arrest today   Consider MRI Brain and cervical spine in next couple of days if patient stabilizes  LTME with no epileptiform discharges, follow post re-arrest  If patient recovers/stabalizes from a cardiac standpoint, we can re-assess patient's neurological prognostication    Remainder of care per primary    We will continue to follow along. For any changes in exam or patient status please contact Neuro Critical Care.       SARAH Chen - [place]  Neuro Critical Care  Pager [phone]  8/22/2021     7:17 AM

## 2021-08-23 NOTE — PLAN OF CARE
Problem: OXYGENATION/RESPIRATORY FUNCTION  Goal: Patient will maintain patent airway  8/22/2021 2104 by Autumn Bueno RN  Outcome: Ongoing     Problem: OXYGENATION/RESPIRATORY FUNCTION  Goal: Patient will achieve/maintain normal respiratory rate/effort  Description: Respiratory rate and effort will be within normal limits for the patient  8/22/2021 2104 by Autumn Bueno RN  Outcome: Ongoing     Problem: MECHANICAL VENTILATION  Goal: Patient will maintain patent airway  8/22/2021 2104 by Autumn Bueno RN  Outcome: Ongoing     Problem: MECHANICAL VENTILATION  Goal: Oral health is maintained or improved  8/22/2021 2104 by Autumn Bueno RN  Outcome: Ongoing     Problem: MECHANICAL VENTILATION  Goal: ET tube will be managed safely  8/22/2021 2104 by Autumn Bueno RN  Outcome: Ongoing     Problem: MECHANICAL VENTILATION  Goal: Ability to express needs and understand communication  8/22/2021 2104 by Autumn Bueno RN  Outcome: Ongoing     Problem: MECHANICAL VENTILATION  Goal: Mobility/activity is maintained at optimum level for patient  8/22/2021 2104 by Autumn Bueno RN  Outcome: Ongoing     Problem: SKIN INTEGRITY  Goal: Skin integrity is maintained or improved  8/22/2021 2104 by Autumn Bueno RN  Outcome: Ongoing     Problem: Skin Integrity:  Goal: Will show no infection signs and symptoms  Description: Will show no infection signs and symptoms  8/22/2021 2104 by Autumn Bueno RN  Outcome: Ongoing     Problem: Skin Integrity:  Goal: Absence of new skin breakdown  Description: Absence of new skin breakdown  8/22/2021 2104 by Autumn Bueno RN  Outcome: Ongoing     Problem: Falls - Risk of:  Goal: Will remain free from falls  Description: Will remain free from falls  8/22/2021 2104 by Autumn Bueno RN  Outcome: Ongoing     Problem: Falls - Risk of:  Goal: Absence of physical injury  Description: Absence of physical injury  8/22/2021 2104 by Autumn Bueno RN  Outcome: Ongoing     Problem: Bleeding:  Goal: Will show no signs and symptoms of excessive bleeding  Description: Will show no signs and symptoms of excessive bleeding  8/22/2021 2104 by Henny Goodson RN  Outcome: Ongoing     Problem: Nutrition  Goal: Optimal nutrition therapy  8/22/2021 2104 by Henny Goodson RN  Outcome: Ongoing     Problem: Non-Violent Restraints  Goal: Removal from restraints as soon as assessed to be safe  8/22/2021 2104 by Henny Goodson RN  Outcome: Ongoing     Problem: Non-Violent Restraints  Goal: No harm/injury to patient while restraints in use  8/22/2021 2104 by Henny Goodson RN  Outcome: Ongoing     Problem: Non-Violent Restraints  Goal: Patient's dignity will be maintained  8/22/2021 2104 by Henny Goodson RN  Outcome: Ongoing

## 2021-08-23 NOTE — FLOWSHEET NOTE
SPIRITUAL CARE DEPARTMENT - [facility] [address]  PROGRESS NOTE    Shift date: 08/22/2021  Shift day: Sunday   Shift # 1    Room # 4986/5611-81     Name: Sabrina Roberts            Age: 62 y.o. Gender: male          Zoroastrianism: None   Place of Jewish: None    Referral: Code Blue    Admit Date & Time: 8/19/2021  2:33 PM    PATIENT/EVENT DESCRIPTION:  Sabrina Robrets is a 62 y.o. male who was admitted to . Valeria Escamilla was called on patient. SPIRITUAL ASSESSMENT/INTERVENTION:  No spiritual assessment was carried out. Family arrived some minutes later and was tearful, anxious and worried. Family expressed sher in God but not affiliated with any Judaism.  maintained listening presence, offered support and prayed with family. Family expressed appreciation for the spiritual and emotional support they received. SPIRITUAL CARE FOLLOW-UP PLAN:  Chaplains would continue to visit for ongoing assessment of patient's condition and for more prayers and support to family. Electronically signed by Fr. Michelle Shepherd on 8/22/2021 at [address] [place] [address]  [phone]         08/22/21 1044   Encounter Summary   Services provided to: Patient and family together   Support System Family members   Place of Roman Catholic None   Continue Visiting   (08/22/2021)   Complexity of Encounter High   Length of Encounter 1 hour;45 minutes   Routine   Type Follow up   Assessment Approachable;Tearful;Fearful; Anxious   Intervention Active listening;Prayer;[place]   Outcome Expressed gratitude   Crisis   Type Code   Spiritual/Methodist   Type Spiritual support
SPIRITUAL CARE DEPARTMENT - [facility] [address]  PROGRESS NOTE    Shift date: 8/19/21  Shift day: Thursday   Shift # 2    Room # 6240/0055-84   Name: Tab Garcia            Age: 62 y.o. Gender: male          Jehovah's witness: Unknown   Place of Hinduism: Unknown    Referral: Routine Visit    Admit Date & Time: 8/19/2021  2:33 PM    PATIENT/EVENT DESCRIPTION:  Tab Garcia is a 62 y.o. male is in room 1028. SPIRITUAL ASSESSMENT/INTERVENTION:   brought family from ED to the CATH Lab. Nurse informed that  that it would take assessments before the family could see the Pt.  provided prayer and support to the family as needed. Family was grateful for the visitation. SPIRITUAL CARE FOLLOW-UP PLAN:  Chaplains will remain available to offer spiritual and emotional support as needed.  followed up with the family but family were not present in the waiting room while medical brought up Pt to room 1028. Electronically signed by oRyce Pierce Resident, on 8/19/2021 at [address]  [phone]       08/19/21 7079   Encounter Summary   Services provided to: Family   Referral/Consult From: Other    Support System Spouse; Family members   Continue Visiting   (8/19/21)   Complexity of Encounter High   Length of Encounter 30 minutes   Spiritual Assessment Completed Yes   Routine   Type Initial   Assessment Anxious; Helplessness; Hopeful;Fearful;Tearful   Intervention Active listening;Explored feelings, thoughts, concerns;Prayer;Nurtured hope;Sustaining presence/ Ministry of presence   Outcome Acceptance;Comfort;Expressed gratitude;Engaged in conversation;Coping; Hopeful;Receptive;Venting emotion
in decision making and to provide support.     Electronically signed by Elizabeth Lam, on 8/23/2021 at 4:52 AM.  [address] [facility]  [phone]

## 2021-08-23 NOTE — DISCHARGE SUMMARY
[facility]  Discharge Note                 Name:  Jennifer Franklin  YOB: 1960  Social Security Number:  [id number]  Medical Record Number:  [id number]    Date of Admission:  2021  Date of Discharge:  2021    Admitting physician: Samantha Jordan MD    Discharge Attending: Eden Nolasco MD, MD  Primary Care Physician: No primary care provider on file. Consultants: Cardiology, Nephrology, Pulmonary/Critical care  Discharge to   Condition at discharge: [address] LIST:  Patient Active Problem List   Diagnosis    STEMI (ST elevation myocardial infarction) [facility])         Procedures:cardiac catheterization, echo    HOSPITAL COURSE :           The patient was admitted for:   Hospital Procedures if any: cardiac cath/PCI, Defibrillation, Cardioversion  Medications changes recommendation: Please see below    Cath 21:   Acute occlusion of mid RCA. Successful PCI / Drug Eluting Stent of the mid RCA with restoration of   ITZEL III flow. Non-obstructive LAD and minimal LCX disease. Borderline LV systolic function. Discharge exam:   Vitals:    21 0415   BP:    Pulse: (!) 0   Resp:    Temp:    SpO2:      Patient presented with Acute STEMI and VF arrest and underwent hypothermia protocol and emergent PCI. He was continued to be managed in the CVICU and remained in critical condition. On 21 he went into Afib with RVR and VT arrest and underwent successful resuscitation however, continued to be hypotensive despite multiple pressors. Multiple attempts at chemically and electrically cardioverting him were unsuccessful. Detailed discussion with whole family regarding poor outcomes and eventually code status changed to [facility]. Patient passed away at 0413 on 21 after continued deterioration despite the support with comfort measures initiated. Family and life connections were updated.      Electronically signed by Eden Nolasco MD on 8/23/2021 at 8:31 [facility]          Attending Physician Statement  I have discussed the case of Ladona Infield including pertinent history and exam findings with the resident. I agree with the assessment, plan and orders as documented by the resident With changes made to the note.      Electronically signed by Kayleen Landrum MD on 8/24/2021 at 2:54 PM.    [facility]      [phone]

## 2021-08-23 NOTE — PROGRESS NOTES
Pt time of death 0413, verified by 2 RNs with  at [facility]. Writer notified cardiology, neurology, nephrology, and life connections of time of death. Writer contacted coroners office and awaiting on return phone call.

## 2021-08-24 LAB
EKG ATRIAL RATE: 102 BPM
EKG ATRIAL RATE: 159 BPM
EKG P AXIS: 68 DEGREES
EKG P-R INTERVAL: 122 MS
EKG Q-T INTERVAL: 334 MS
EKG Q-T INTERVAL: 386 MS
EKG QRS DURATION: 90 MS
EKG QRS DURATION: 90 MS
EKG QTC CALCULATION (BAZETT): 435 MS
EKG QTC CALCULATION (BAZETT): 538 MS
EKG R AXIS: 49 DEGREES
EKG R AXIS: 61 DEGREES
EKG T AXIS: -64 DEGREES
EKG T AXIS: 144 DEGREES
EKG VENTRICULAR RATE: 102 BPM
EKG VENTRICULAR RATE: 117 BPM

## 2021-08-25 LAB — NEURON SPEC ENOLASE: 9.1 UG/L (ref 3.7–8.9)

## 2021-09-17 NOTE — ED PROVIDER NOTES
CHEST PORTABLE   Final Result   Worsening bilateral pulmonary opacities consistent favoring worsening   multifocal pneumonitis. Pulmonary edema may have a similar appearance. Tubes and lines as above. US RENAL COMPLETE   Final Result   Slightly limited but essentially unremarkable renal ultrasound. No   hydronephrosis. Incidental small 1.1 cm hyperechoic liver lesion, nonspecific but favored to   represent a small hemangioma. MRI could be obtained if clinically warranted. XR CHEST PORTABLE   Final Result   Worsening aeration right lung base questioning developing pneumonia and/or   atelectasis with a persistent right suprahilar opacity. Subcutaneous   emphysema is present. CT scan of the chest would prove helpful for complete   assessment. XR CHEST PORTABLE   Final Result   Stable support lines      Asymmetric prominence of the right hilum with suggestion of spiculated right   upper lung mass. Malignancy is a differential consideration. Correlative CT   chest suggested      New mild diffuse asymmetric right lung interstitial infiltrate related to   asymmetric edema and/or inflammation         XR CHEST PORTABLE   Final Result   1. Tube positioning as discussed   2. No acute abnormality   3. Spiculated lesion in the right suprahilar region which may represent   scarring or mass.   Recommend CT chest when clinically feasible               LABS:  Labs Reviewed   ELECTROLYTES PLUS - Abnormal; Notable for the following components:       Result Value    POC Chloride 111 (*)     POC TCO2 21 (*)     All other components within normal limits   HGB/HCT - Abnormal; Notable for the following components:    POC Hemoglobin 13.0 (*)     POC Hematocrit 38 (*)     All other components within normal limits   CBC WITH AUTO DIFFERENTIAL - Abnormal; Notable for the following components:    WBC 21.3 (*)     RBC 3.91 (*)     Hemoglobin 11.6 (*)     .1 (*)     RDW 14.7 (*)     Immature Granulocytes 2 (*)     Monocytes 8 (*)     Eosinophils % 0 (*)     Absolute Immature Granulocyte 0.43 (*)     Segs Absolute 11.71 (*)     Absolute Lymph # 7.46 (*)     Absolute Mono # 1.70 (*)     All other components within normal limits   COMPREHENSIVE METABOLIC PANEL - Abnormal; Notable for the following components:    Glucose 254 (*)     Calcium 7.8 (*)     CO2 14 (*)     Anion Gap 22 (*)     ALT 44 (*)      (*)     Total Protein 5.1 (*)     Albumin 3.0 (*)     All other components within normal limits   TROPONIN - Abnormal; Notable for the following components:    Troponin, High Sensitivity 821 (*)     All other components within normal limits   BRAIN NATRIURETIC PEPTIDE - Abnormal; Notable for the following components:    Pro- (*)     All other components within normal limits   TROPONIN - Abnormal; Notable for the following components:    Troponin, High Sensitivity 6,204 (*)     All other components within normal limits   TROPONIN - Abnormal; Notable for the following components:    Troponin, High Sensitivity 5,292 (*)     All other components within normal limits   BASIC METABOLIC PANEL - Abnormal; Notable for the following components:    Glucose 248 (*)     Calcium 7.4 (*)     All other components within normal limits   BASIC METABOLIC PANEL - Abnormal; Notable for the following components:    Glucose 153 (*)     All other components within normal limits   PHOSPHORUS - Abnormal; Notable for the following components:    Phosphorus 4.7 (*)     All other components within normal limits   CALCIUM, IONIZED - Abnormal; Notable for the following components:    Calcium, Ion 1.02 (*)     All other components within normal limits   LACTIC ACID, WHOLE BLOOD - Abnormal; Notable for the following components:    Lactic Acid, Whole Blood 2.4 (*)     All other components within normal limits   BASIC METABOLIC PANEL W/ REFLEX TO MG FOR LOW K - Abnormal; Notable for the following components:    Glucose 145 (*) Calcium 8.1 (*)     Chloride 111 (*)     All other components within normal limits   LIPID PANEL - Abnormal; Notable for the following components:    HDL 40 (*)     All other components within normal limits   CBC - Abnormal; Notable for the following components:    RBC 3.84 (*)     Hemoglobin 11.6 (*)     Hematocrit 36.9 (*)     RDW 14.9 (*)     All other components within normal limits   NEURON SPECIFIC ENOLASE (NSE) - Abnormal; Notable for the following components:    Neuron Specific Enolase 17.8 (*)     All other components within normal limits   TROPONIN - Abnormal; Notable for the following components:    Troponin, High Sensitivity 3,748 (*)     All other components within normal limits   BASIC METABOLIC PANEL - Abnormal; Notable for the following components:    Glucose 119 (*)     Calcium 7.9 (*)     Sodium 146 (*)     Chloride 113 (*)     All other components within normal limits   TROPONIN - Abnormal; Notable for the following components:    Troponin, High Sensitivity 2,830 (*)     All other components within normal limits   BASIC METABOLIC PANEL - Abnormal; Notable for the following components:    Glucose 114 (*)     Calcium 8.0 (*)     Chloride 111 (*)     All other components within normal limits   TROPONIN - Abnormal; Notable for the following components:    Troponin, High Sensitivity 2,275 (*)     All other components within normal limits   BASIC METABOLIC PANEL - Abnormal; Notable for the following components:    Glucose 120 (*)     Calcium 8.1 (*)     Sodium 145 (*)     Chloride 113 (*)     CO2 18 (*)     All other components within normal limits   TROPONIN - Abnormal; Notable for the following components:    Troponin, High Sensitivity 1,680 (*)     All other components within normal limits   TROPONIN - Abnormal; Notable for the following components:    Troponin, High Sensitivity 1,639 (*)     All other components within normal limits   BASIC METABOLIC PANEL W/ REFLEX TO MG FOR LOW K - Abnormal; Notable for the following components:    Glucose 114 (*)     Calcium 8.1 (*)     Chloride 112 (*)     CO2 19 (*)     All other components within normal limits   CBC - Abnormal; Notable for the following components:    RBC 4.08 (*)     Hemoglobin 12.1 (*)     Hematocrit 40.3 (*)     RDW 15.1 (*)     All other components within normal limits   NEURON SPECIFIC ENOLASE (NSE) - Abnormal; Notable for the following components:    Neuron Specific Enolase 14.1 (*)     All other components within normal limits   URINALYSIS - Abnormal; Notable for the following components:    Turbidity UA CLOUDY (*)     Urine Hgb MODERATE (*)     Protein, UA 1+ (*)     All other components within normal limits   BASIC METABOLIC PANEL W/ REFLEX TO MG FOR LOW K - Abnormal; Notable for the following components:    Glucose 103 (*)     BUN 26 (*)     Calcium 8.5 (*)     Sodium 148 (*)     Chloride 116 (*)     CO2 18 (*)     All other components within normal limits   NEURON SPECIFIC ENOLASE (NSE) - Abnormal; Notable for the following components:    Neuron Specific Enolase 9.1 (*)     All other components within normal limits   ELECTROLYTES PLUS - Abnormal; Notable for the following components:    POC Sodium 151 (*)     POC Potassium 2.7 (*)     POC Chloride 115 (*)     All other components within normal limits   HGB/HCT - Abnormal; Notable for the following components:    POC Hemoglobin 10.6 (*)     POC Hematocrit 31 (*)     All other components within normal limits   CBC - Abnormal; Notable for the following components:    RBC 3.25 (*)     Hemoglobin 9.9 (*)     Hematocrit 32.9 (*)     RDW 15.6 (*)     NRBC Automated 0.5 (*)     All other components within normal limits   BASIC METABOLIC PANEL - Abnormal; Notable for the following components:    Glucose 126 (*)     BUN 27 (*)     CREATININE 1.25 (*)     Calcium 8.5 (*)     Chloride 114 (*)     CO2 17 (*)     GFR Non- 59 (*)     All other components within normal limits   LACTATE, SEPSIS - Abnormal; Notable for the following components:    Lactic Acid, Sepsis, Whole Blood 5.5 (*)     All other components within normal limits   CBC - Abnormal; Notable for the following components:    WBC 2.6 (*)     RBC 3.24 (*)     Hemoglobin 9.8 (*)     Hematocrit 33.7 (*)     .0 (*)     RDW 15.6 (*)     Platelets 265 (*)     NRBC Automated 10.1 (*)     All other components within normal limits   BASIC METABOLIC PANEL - Abnormal; Notable for the following components:    Glucose 63 (*)     BUN 37 (*)     CREATININE 2.93 (*)     Calcium 7.1 (*)     Potassium 5.6 (*)     Chloride 114 (*)     CO2 12 (*)     GFR Non- 22 (*)     GFR  27 (*)     All other components within normal limits   MAGNESIUM - Abnormal; Notable for the following components:    Magnesium 3.2 (*)     All other components within normal limits   PROTIME-INR - Abnormal; Notable for the following components:    Protime 16.9 (*)     All other components within normal limits   VENOUS BLOOD GAS, POINT OF CARE - Abnormal; Notable for the following components:    pH, Ameya 6.902 (*)     pCO2, Ameya 94.8 (*)     pO2, Ameya 23.2 (*)     HCO3, Venous 18.7 (*)     Negative Base Excess, Ameya 16 (*)     O2 Sat, Ameya 16 (*)     All other components within normal limits   CREATININE W/GFR POINT OF CARE - Abnormal; Notable for the following components:    POC Creatinine 1.27 (*)     All other components within normal limits   LACTIC ACID,POINT OF CARE - Abnormal; Notable for the following components:    POC Lactic Acid 9.36 (*)     All other components within normal limits   POCT GLUCOSE - Abnormal; Notable for the following components:    POC Glucose 263 (*)     All other components within normal limits   ARTERIAL BLOOD GAS, POC - Abnormal; Notable for the following components:    POC pH 7.342 (*)     POC PO2 170.8 (*)     POC O2 SAT 99 (*)     All other components within normal limits   ARTERIAL BLOOD GAS, POC - Abnormal; other components within normal limits   POCT GLUCOSE - Abnormal; Notable for the following components:    POC Glucose 102 (*)     All other components within normal limits   ARTERIAL BLOOD GAS, POC - Abnormal; Notable for the following components:    POC pH 7.293 (*)     Negative Base Excess, Art 4 (*)     All other components within normal limits   POCT GLUCOSE - Abnormal; Notable for the following components:    POC Glucose 110 (*)     All other components within normal limits   ARTERIAL BLOOD GAS, POC - Abnormal; Notable for the following components:    POC pH 7.323 (*)     POC PO2 74.0 (*)     Negative Base Excess, Art 4 (*)     POC O2 SAT 93 (*)     All other components within normal limits   ARTERIAL BLOOD GAS, POC - Abnormal; Notable for the following components:    POC pH 7.323 (*)     POC PO2 72.0 (*)     Negative Base Excess, Art 4 (*)     POC O2 SAT 93 (*)     All other components within normal limits   ARTERIAL BLOOD GAS, POC - Abnormal; Notable for the following components:    POC pH 7.174 (*)     POC pCO2 64.3 (*)     POC PO2 121.2 (*)     Negative Base Excess, Art 5 (*)     All other components within normal limits   CREATININE W/GFR POINT OF CARE - Abnormal; Notable for the following components:    POC Creatinine 1.52 (*)     GFR Comment 57 (*)     GFR Non- 47 (*)     All other components within normal limits   UREA N (POC) - Abnormal; Notable for the following components:    POC BUN 27 (*)     All other components within normal limits   LACTIC ACID,POINT OF CARE - Abnormal; Notable for the following components:    POC Lactic Acid 6.21 (*)     All other components within normal limits   POCT GLUCOSE - Abnormal; Notable for the following components:    POC Glucose 147 (*)     All other components within normal limits   ARTERIAL BLOOD GAS, POC - Abnormal; Notable for the following components:    POC pH 7.241 (*)     POC PO2 245.1 (*)     POC HCO3 20.3 (*)     Negative Base Excess, Art 7 (*)     POC O2  (*)     All other components within normal limits   CALCIUM, IONIC (POC)   PROTIME-INR   APTT   MAGNESIUM   MAGNESIUM   PHOSPHORUS   CALCIUM, IONIZED   CALCIUM, IONIZED   BLOOD GAS, ARTERIAL   BLOOD GAS, ARTERIAL   LACTIC ACID, PLASMA   LACTIC ACID, PLASMA   MAGNESIUM   PHOSPHORUS   BLOOD GAS, ARTERIAL   BLOOD GAS, ARTERIAL   CALCIUM, IONIZED   LACTIC ACID, PLASMA   MAGNESIUM   PHOSPHORUS   BLOOD GAS, ARTERIAL   CALCIUM, IONIZED   LACTIC ACID, PLASMA   MAGNESIUM   PHOSPHORUS   BLOOD GAS, ARTERIAL   CALCIUM, IONIZED   MAGNESIUM   PHOSPHORUS   POTASSIUM (POC)   SODIUM, URINE, RANDOM   CREATININE, RANDOM URINE   POTASSIUM, URINE, RANDOM   TRIGLYCERIDES   MICROSCOPIC URINALYSIS   CALCIUM, IONIC (POC)   CALCIUM, IONIZED   PREVIOUS SPECIMEN   UREA N (POC)   POCT GLUCOSE   POCT GLUCOSE   POCT GLUCOSE         EMERGENCY DEPARTMENT COURSE:     -------------------------  BP: (!) 52/37, Temp: 97.9 °F (36.6 °C), Pulse: (!) 0, Resp: (!) 32      Comments    Patient was brought in after cardiac arrest while mowing the lawn. EKG showed inferior STEMI, Cath Lab activated. Patient in PEA on arrival ROSC obtained in the emergency department. Intubated per procedure note, patient taken directly to Cath Lab    CRITICAL CARE: There was a high probability of clinically significant/life threatening deterioration in this patient's condition which required my urgent intervention. Total critical care time was 30 minutes. This excludes any time for separately reportable procedures.        (Please note that portions of this note were completed with a voice recognition program.  Efforts were made to edit the dictations but occasionally words are mis-transcribed.)      Kari Galindo MD,, MD  Attending Emergency Physician          Kari Galindo MD  09/17/21 [id number]